# Patient Record
Sex: MALE | Race: WHITE | NOT HISPANIC OR LATINO | Employment: FULL TIME | URBAN - METROPOLITAN AREA
[De-identification: names, ages, dates, MRNs, and addresses within clinical notes are randomized per-mention and may not be internally consistent; named-entity substitution may affect disease eponyms.]

---

## 2017-01-26 ENCOUNTER — HOSPITAL ENCOUNTER (OUTPATIENT)
Dept: URGENT CARE | Facility: CLINIC | Age: 42
Discharge: HOME | End: 2017-01-26
Attending: FAMILY MEDICINE | Admitting: FAMILY MEDICINE

## 2019-01-23 ENCOUNTER — HOSPITAL ENCOUNTER (OUTPATIENT)
Dept: URGENT CARE | Facility: CLINIC | Age: 44
Discharge: HOME | End: 2019-01-23
Attending: FAMILY MEDICINE | Admitting: FAMILY MEDICINE

## 2019-08-24 PROCEDURE — 87186 SC STD MICRODIL/AGAR DIL: CPT | Performed by: FAMILY MEDICINE

## 2019-08-24 PROCEDURE — 87205 SMEAR GRAM STAIN: CPT | Performed by: FAMILY MEDICINE

## 2019-08-24 PROCEDURE — 87070 CULTURE OTHR SPECIMN AEROBIC: CPT | Performed by: FAMILY MEDICINE

## 2019-08-24 PROCEDURE — 87147 CULTURE TYPE IMMUNOLOGIC: CPT | Performed by: FAMILY MEDICINE

## 2020-05-02 ENCOUNTER — HOSPITAL ENCOUNTER (INPATIENT)
Facility: HOSPITAL | Age: 45
LOS: 6 days | Discharge: HOME | End: 2020-05-08
Attending: INTERNAL MEDICINE | Admitting: ORTHOPAEDIC SURGERY

## 2020-05-02 ENCOUNTER — APPOINTMENT (OUTPATIENT)
Dept: GENERAL RADIOLOGY | Facility: HOSPITAL | Age: 45
End: 2020-05-02

## 2020-05-02 DIAGNOSIS — L03.115 CELLULITIS OF RIGHT LOWER EXTREMITY: Primary | ICD-10-CM

## 2020-05-02 DIAGNOSIS — M71.061 ABSCESS OF BURSA, RIGHT KNEE: ICD-10-CM

## 2020-05-02 DIAGNOSIS — M25.561 RIGHT KNEE PAIN, UNSPECIFIED CHRONICITY: ICD-10-CM

## 2020-05-02 DIAGNOSIS — F17.200 TOBACCO DEPENDENCE SYNDROME: Chronic | ICD-10-CM

## 2020-05-02 PROBLEM — J44.1 COPD WITH EXACERBATION (HCC): Status: ACTIVE | Noted: 2019-01-23

## 2020-05-02 PROBLEM — J44.9 COPD (CHRONIC OBSTRUCTIVE PULMONARY DISEASE) (HCC): Chronic | Status: ACTIVE | Noted: 2020-05-02

## 2020-05-02 LAB
ALBUMIN SERPL-MCNC: 3.9 G/DL (ref 3.5–5.2)
ALBUMIN/GLOB SERPL: 1.1 G/DL
ALP SERPL-CCNC: 73 U/L (ref 39–117)
ALT SERPL W P-5'-P-CCNC: 15 U/L (ref 1–41)
ANION GAP SERPL CALCULATED.3IONS-SCNC: 13 MMOL/L (ref 5–15)
APTT PPP: 25.1 SECONDS (ref 24–31)
AST SERPL-CCNC: 15 U/L (ref 1–40)
BASOPHILS # BLD AUTO: 0.1 10*3/MM3 (ref 0–0.2)
BASOPHILS NFR BLD AUTO: 0.5 % (ref 0–1.5)
BILIRUB SERPL-MCNC: 0.3 MG/DL (ref 0.2–1.2)
BUN BLD-MCNC: 10 MG/DL (ref 6–20)
BUN/CREAT SERPL: 9.4 (ref 7–25)
CALCIUM SPEC-SCNC: 9.4 MG/DL (ref 8.6–10.5)
CHLORIDE SERPL-SCNC: 101 MMOL/L (ref 98–107)
CO2 SERPL-SCNC: 23 MMOL/L (ref 22–29)
CREAT BLD-MCNC: 1.06 MG/DL (ref 0.76–1.27)
CRP SERPL-MCNC: 11.88 MG/DL (ref 0–0.5)
D DIMER PPP FEU-MCNC: 0.91 MCGFEU/ML (ref 0.17–0.59)
DEPRECATED RDW RBC AUTO: 42.4 FL (ref 37–54)
EOSINOPHIL # BLD AUTO: 0.1 10*3/MM3 (ref 0–0.4)
EOSINOPHIL NFR BLD AUTO: 0.6 % (ref 0.3–6.2)
ERYTHROCYTE [DISTWIDTH] IN BLOOD BY AUTOMATED COUNT: 13.4 % (ref 12.3–15.4)
ERYTHROCYTE [SEDIMENTATION RATE] IN BLOOD: 75 MM/HR (ref 0–15)
GFR SERPL CREATININE-BSD FRML MDRD: 75 ML/MIN/1.73
GLOBULIN UR ELPH-MCNC: 3.7 GM/DL
GLUCOSE BLD-MCNC: 137 MG/DL (ref 65–99)
HCT VFR BLD AUTO: 42.2 % (ref 37.5–51)
HGB BLD-MCNC: 14.5 G/DL (ref 13–17.7)
INR PPP: 1.08 (ref 0.9–1.1)
LYMPHOCYTES # BLD AUTO: 1.5 10*3/MM3 (ref 0.7–3.1)
LYMPHOCYTES NFR BLD AUTO: 9.1 % (ref 19.6–45.3)
MCH RBC QN AUTO: 31.2 PG (ref 26.6–33)
MCHC RBC AUTO-ENTMCNC: 34.3 G/DL (ref 31.5–35.7)
MCV RBC AUTO: 90.9 FL (ref 79–97)
MONOCYTES # BLD AUTO: 1.3 10*3/MM3 (ref 0.1–0.9)
MONOCYTES NFR BLD AUTO: 8.1 % (ref 5–12)
NEUTROPHILS # BLD AUTO: 13 10*3/MM3 (ref 1.7–7)
NEUTROPHILS NFR BLD AUTO: 81.7 % (ref 42.7–76)
NRBC BLD AUTO-RTO: 0 /100 WBC (ref 0–0.2)
PLATELET # BLD AUTO: 418 10*3/MM3 (ref 140–450)
PMV BLD AUTO: 6.3 FL (ref 6–12)
POTASSIUM BLD-SCNC: 4 MMOL/L (ref 3.5–5.2)
PROT SERPL-MCNC: 7.6 G/DL (ref 6–8.5)
PROTHROMBIN TIME: 11.1 SECONDS (ref 9.6–11.7)
RBC # BLD AUTO: 4.65 10*6/MM3 (ref 4.14–5.8)
SODIUM BLD-SCNC: 137 MMOL/L (ref 136–145)
URATE SERPL-MCNC: 3.3 MG/DL (ref 3.4–7)
WBC NRBC COR # BLD: 15.9 10*3/MM3 (ref 3.4–10.8)

## 2020-05-02 PROCEDURE — 85379 FIBRIN DEGRADATION QUANT: CPT | Performed by: PHYSICIAN ASSISTANT

## 2020-05-02 PROCEDURE — 25010000002 VANCOMYCIN 10 G RECONSTITUTED SOLUTION: Performed by: PHYSICIAN ASSISTANT

## 2020-05-02 PROCEDURE — 85652 RBC SED RATE AUTOMATED: CPT | Performed by: PHYSICIAN ASSISTANT

## 2020-05-02 PROCEDURE — 87205 SMEAR GRAM STAIN: CPT | Performed by: PHYSICIAN ASSISTANT

## 2020-05-02 PROCEDURE — 84550 ASSAY OF BLOOD/URIC ACID: CPT | Performed by: PHYSICIAN ASSISTANT

## 2020-05-02 PROCEDURE — 85730 THROMBOPLASTIN TIME PARTIAL: CPT | Performed by: PHYSICIAN ASSISTANT

## 2020-05-02 PROCEDURE — 99284 EMERGENCY DEPT VISIT MOD MDM: CPT

## 2020-05-02 PROCEDURE — 87040 BLOOD CULTURE FOR BACTERIA: CPT | Performed by: PHYSICIAN ASSISTANT

## 2020-05-02 PROCEDURE — 85025 COMPLETE CBC W/AUTO DIFF WBC: CPT | Performed by: PHYSICIAN ASSISTANT

## 2020-05-02 PROCEDURE — 87186 SC STD MICRODIL/AGAR DIL: CPT | Performed by: PHYSICIAN ASSISTANT

## 2020-05-02 PROCEDURE — G0378 HOSPITAL OBSERVATION PER HR: HCPCS

## 2020-05-02 PROCEDURE — 25010000002 KETOROLAC TROMETHAMINE PER 15 MG: Performed by: PHYSICIAN ASSISTANT

## 2020-05-02 PROCEDURE — 25010000002 MORPHINE PER 10 MG: Performed by: EMERGENCY MEDICINE

## 2020-05-02 PROCEDURE — 99222 1ST HOSP IP/OBS MODERATE 55: CPT | Performed by: PHYSICIAN ASSISTANT

## 2020-05-02 PROCEDURE — 87070 CULTURE OTHR SPECIMN AEROBIC: CPT | Performed by: PHYSICIAN ASSISTANT

## 2020-05-02 PROCEDURE — 86140 C-REACTIVE PROTEIN: CPT | Performed by: PHYSICIAN ASSISTANT

## 2020-05-02 PROCEDURE — 85610 PROTHROMBIN TIME: CPT | Performed by: PHYSICIAN ASSISTANT

## 2020-05-02 PROCEDURE — 80053 COMPREHEN METABOLIC PANEL: CPT | Performed by: PHYSICIAN ASSISTANT

## 2020-05-02 PROCEDURE — 25010000002 ONDANSETRON PER 1 MG: Performed by: EMERGENCY MEDICINE

## 2020-05-02 PROCEDURE — 25010000002 PIPERACILLIN SOD-TAZOBACTAM PER 1 G: Performed by: PHYSICIAN ASSISTANT

## 2020-05-02 PROCEDURE — 87147 CULTURE TYPE IMMUNOLOGIC: CPT | Performed by: PHYSICIAN ASSISTANT

## 2020-05-02 PROCEDURE — 73560 X-RAY EXAM OF KNEE 1 OR 2: CPT

## 2020-05-02 RX ORDER — ONDANSETRON 2 MG/ML
4 INJECTION INTRAMUSCULAR; INTRAVENOUS ONCE
Status: COMPLETED | OUTPATIENT
Start: 2020-05-02 | End: 2020-05-02

## 2020-05-02 RX ORDER — BISACODYL 10 MG
10 SUPPOSITORY, RECTAL RECTAL DAILY PRN
Status: DISCONTINUED | OUTPATIENT
Start: 2020-05-02 | End: 2020-05-06 | Stop reason: HOSPADM

## 2020-05-02 RX ORDER — ONDANSETRON 2 MG/ML
4 INJECTION INTRAMUSCULAR; INTRAVENOUS EVERY 6 HOURS PRN
Status: DISCONTINUED | OUTPATIENT
Start: 2020-05-02 | End: 2020-05-06 | Stop reason: HOSPADM

## 2020-05-02 RX ORDER — POTASSIUM CHLORIDE 20 MEQ/1
40 TABLET, EXTENDED RELEASE ORAL AS NEEDED
Status: DISCONTINUED | OUTPATIENT
Start: 2020-05-02 | End: 2020-05-06 | Stop reason: HOSPADM

## 2020-05-02 RX ORDER — NICOTINE 21 MG/24HR
1 PATCH, TRANSDERMAL 24 HOURS TRANSDERMAL NIGHTLY
Status: DISCONTINUED | OUTPATIENT
Start: 2020-05-02 | End: 2020-05-08 | Stop reason: HOSPADM

## 2020-05-02 RX ORDER — VANCOMYCIN 1.75 GRAM/500 ML IN 0.9 % SODIUM CHLORIDE INTRAVENOUS
20 ONCE
Status: COMPLETED | OUTPATIENT
Start: 2020-05-02 | End: 2020-05-02

## 2020-05-02 RX ORDER — SODIUM CHLORIDE 0.9 % (FLUSH) 0.9 %
10 SYRINGE (ML) INJECTION EVERY 12 HOURS SCHEDULED
Status: DISCONTINUED | OUTPATIENT
Start: 2020-05-02 | End: 2020-05-06 | Stop reason: HOSPADM

## 2020-05-02 RX ORDER — CHOLECALCIFEROL (VITAMIN D3) 125 MCG
5 CAPSULE ORAL NIGHTLY PRN
Status: DISCONTINUED | OUTPATIENT
Start: 2020-05-02 | End: 2020-05-08 | Stop reason: HOSPADM

## 2020-05-02 RX ORDER — MULTIPLE VITAMINS W/ MINERALS TAB 9MG-400MCG
1 TAB ORAL DAILY
COMMUNITY

## 2020-05-02 RX ORDER — ACETAMINOPHEN 325 MG/1
650 TABLET ORAL EVERY 4 HOURS PRN
Status: DISCONTINUED | OUTPATIENT
Start: 2020-05-02 | End: 2020-05-06 | Stop reason: HOSPADM

## 2020-05-02 RX ORDER — NITROGLYCERIN 0.4 MG/1
0.4 TABLET SUBLINGUAL
Status: DISCONTINUED | OUTPATIENT
Start: 2020-05-02 | End: 2020-05-03

## 2020-05-02 RX ORDER — MAGNESIUM SULFATE 1 G/100ML
1 INJECTION INTRAVENOUS AS NEEDED
Status: DISCONTINUED | OUTPATIENT
Start: 2020-05-02 | End: 2020-05-06 | Stop reason: HOSPADM

## 2020-05-02 RX ORDER — ACETAMINOPHEN 650 MG/1
650 SUPPOSITORY RECTAL EVERY 4 HOURS PRN
Status: DISCONTINUED | OUTPATIENT
Start: 2020-05-02 | End: 2020-05-06 | Stop reason: HOSPADM

## 2020-05-02 RX ORDER — SODIUM CHLORIDE 0.9 % (FLUSH) 0.9 %
10 SYRINGE (ML) INJECTION AS NEEDED
Status: DISCONTINUED | OUTPATIENT
Start: 2020-05-02 | End: 2020-05-06 | Stop reason: HOSPADM

## 2020-05-02 RX ORDER — KETOROLAC TROMETHAMINE 30 MG/ML
30 INJECTION, SOLUTION INTRAMUSCULAR; INTRAVENOUS EVERY 6 HOURS PRN
Status: DISCONTINUED | OUTPATIENT
Start: 2020-05-02 | End: 2020-05-06 | Stop reason: HOSPADM

## 2020-05-02 RX ORDER — MAGNESIUM SULFATE HEPTAHYDRATE 40 MG/ML
2 INJECTION, SOLUTION INTRAVENOUS AS NEEDED
Status: DISCONTINUED | OUTPATIENT
Start: 2020-05-02 | End: 2020-05-06 | Stop reason: HOSPADM

## 2020-05-02 RX ORDER — ACETAMINOPHEN 160 MG/5ML
650 SOLUTION ORAL EVERY 4 HOURS PRN
Status: DISCONTINUED | OUTPATIENT
Start: 2020-05-02 | End: 2020-05-06 | Stop reason: HOSPADM

## 2020-05-02 RX ORDER — MORPHINE SULFATE 4 MG/ML
4 INJECTION, SOLUTION INTRAMUSCULAR; INTRAVENOUS ONCE
Status: COMPLETED | OUTPATIENT
Start: 2020-05-02 | End: 2020-05-02

## 2020-05-02 RX ORDER — ALUMINA, MAGNESIA, AND SIMETHICONE 2400; 2400; 240 MG/30ML; MG/30ML; MG/30ML
15 SUSPENSION ORAL EVERY 6 HOURS PRN
Status: DISCONTINUED | OUTPATIENT
Start: 2020-05-02 | End: 2020-05-06 | Stop reason: HOSPADM

## 2020-05-02 RX ORDER — ONDANSETRON 4 MG/1
4 TABLET, FILM COATED ORAL EVERY 6 HOURS PRN
Status: DISCONTINUED | OUTPATIENT
Start: 2020-05-02 | End: 2020-05-06 | Stop reason: HOSPADM

## 2020-05-02 RX ADMIN — PIPERACILLIN AND TAZOBACTAM 3.38 G: 3; .375 INJECTION, POWDER, FOR SOLUTION INTRAVENOUS at 17:32

## 2020-05-02 RX ADMIN — KETOROLAC TROMETHAMINE 30 MG: 30 INJECTION, SOLUTION INTRAMUSCULAR at 21:14

## 2020-05-02 RX ADMIN — Medication 10 ML: at 21:15

## 2020-05-02 RX ADMIN — MORPHINE SULFATE 4 MG: 4 INJECTION INTRAVENOUS at 17:22

## 2020-05-02 RX ADMIN — ONDANSETRON 4 MG: 2 INJECTION INTRAMUSCULAR; INTRAVENOUS at 17:21

## 2020-05-02 RX ADMIN — VANCOMYCIN HYDROCHLORIDE 1750 MG: 10 INJECTION, POWDER, LYOPHILIZED, FOR SOLUTION INTRAVENOUS at 18:03

## 2020-05-02 NOTE — ED PROVIDER NOTES
"Subjective   Patient is a 44-year-old  male, otherwise healthy presents the ER for worsening vehicle with chief complaint of \"right knee pain\".  Patient reports a 9-month history of \"boils that come and go all over my body\".  Patient states that normally these boils appear and resolve on their own. Patient does report previous MRSA history.  Patient states he noticed a boil on anterior aspect of his right knee 2 weeks ago, states that the redness, swelling and pain has progressively gotten worse since then.  Patient rates his pain as moderate to severe.  Patient states that the pain is gotten so bad it hurts to walk or bend his knee due to the swelling.  Patient denies any chest pain, shortness of breath, dumping, nausea vomiting headache or fever or chills.  Patient states that he was seen at [facility] 4 days ago, was given Rocephin shot, anti-inflammatories and discharged with Bactrim.  Patient states he has been taking this as prescribed, was seen at urgent care clinic today for worsening erythema, edema and pain was advised to come to ER for further evaluation.  Patient denies any numbness or tingling in his extremities, patient has no other complaints at this time. Patient is a 1.5 ppd smoker.      History provided by:  Patient and medical records      Review of Systems   Constitutional: Positive for chills. Negative for fever.   HENT: Negative for sore throat and trouble swallowing.    Respiratory: Negative for shortness of breath and wheezing.    Cardiovascular: Negative for chest pain.   Gastrointestinal: Negative for abdominal pain, diarrhea, nausea and vomiting.   Genitourinary: Negative for dysuria.   Musculoskeletal: Positive for arthralgias.        Right knee pain   Skin: Positive for color change. Negative for rash and wound.   Neurological: Negative for dizziness, weakness and headaches.   Psychiatric/Behavioral: Negative for behavioral problems.   All other systems reviewed and " are negative.      No past medical history on file.    No Known Allergies    No past surgical history on file.    Family History   Problem Relation Age of Onset   • Bone cancer Mother    • Diabetes Father    • Stroke Father        Social History     Socioeconomic History   • Marital status: Single     Spouse name: Not on file   • Number of children: Not on file   • Years of education: Not on file   • Highest education level: Not on file   Tobacco Use   • Smoking status: Current Every Day Smoker     Packs/day: 1.00     Years: 20.00     Pack years: 20.00   • Smokeless tobacco: Never Used   Substance and Sexual Activity   • Alcohol use: Yes     Comment: very rare   • Drug use: Yes     Types: Marijuana     Comment: on occassion   • Sexual activity: Defer           Objective   Physical Exam   Constitutional: He is oriented to person, place, and time. He appears well-developed and well-nourished. No distress.   HENT:   Head: Normocephalic and atraumatic.   Eyes: Pupils are equal, round, and reactive to light. EOM are normal.   Cardiovascular: Normal rate, regular rhythm, normal heart sounds and intact distal pulses.   No murmur heard.  Pulmonary/Chest: Effort normal and breath sounds normal. He has no wheezes.   Musculoskeletal: He exhibits edema and tenderness. He exhibits no deformity.   Tenderness palpation anterior and posterior aspect of right knee  DP pulses present 2+ bilaterally  Pain with knee flexion and extension   Neurological: He is alert and oriented to person, place, and time. No sensory deficit.   Skin: Skin is warm. Capillary refill takes less than 2 seconds. No rash noted. He is not diaphoretic. There is erythema.   Significant erythema, warmth surrounding anterior aspect right knee with 1.5 x 1 cm superficial area of skin breakdown with mild drainage  Skin is taut, shiny, erythematous, warm, not fluctuant    Enlarged right inguinal lymph node palpated   Psychiatric: He has a normal mood and affect. His  "behavior is normal.   Nursing note and vitals reviewed.          Procedures           ED Course  ED Course as of May 02 1845   Sat May 02, 2020   1819 Patient reevaluated, reports significant provement in his pain.  Discussed hospital admission as patient has failed outpatient therapy and is currently on vancomycin for antibiotics.  Patient states he does not have insurance.  [facility] was paged and stated that they would see patient in ER    [MM]   1843 Spoke to PATY Song who agreed to accept patient for admission for Dr. Hein    [MM]      ED Course User Index  [MM] Jenna Duvall PA    /71   Pulse 89   Temp 98.5 °F (36.9 °C) (Oral)   Resp 19   Ht 182.9 cm (72\")   Wt 83.8 kg (184 lb 11.9 oz)   SpO2 (!) 78%   BMI 25.06 kg/m²   Labs Reviewed   COMPREHENSIVE METABOLIC PANEL - Abnormal; Notable for the following components:       Result Value    Glucose 137 (*)     All other components within normal limits    Narrative:     GFR Normal >60  Chronic Kidney Disease <60  Kidney Failure <15     URIC ACID - Abnormal; Notable for the following components:    Uric Acid 3.3 (*)     All other components within normal limits   CBC WITH AUTO DIFFERENTIAL - Abnormal; Notable for the following components:    WBC 15.90 (*)     Neutrophil % 81.7 (*)     Lymphocyte % 9.1 (*)     Neutrophils, Absolute 13.00 (*)     Monocytes, Absolute 1.30 (*)     All other components within normal limits   PROTIME-INR - Normal   BLOOD CULTURE   BLOOD CULTURE   WOUND CULTURE   SEDIMENTATION RATE   CBC AND DIFFERENTIAL    Narrative:     The following orders were created for panel order CBC & Differential.  Procedure                               Abnormality         Status                     ---------                               -----------         ------                     CBC Auto Differential[[id number]]        Abnormal            Final result                 Please view results for these tests on the individual orders.     "     Medications   sodium chloride 0.9 % flush 10 mL (has no administration in time range)   Morphine sulfate (PF) injection 4 mg (4 mg Intravenous Given 5/2/20 1722)   ondansetron (ZOFRAN) injection 4 mg (4 mg Intravenous Given 5/2/20 1721)   piperacillin-tazobactam (ZOSYN) IVPB 3.375 g in 100 mL NS (CD) (0 g Intravenous Stopped 5/2/20 1803)   vancomycin IVPB 1750 mg in 0.9% Sodium Chloride (premix) 500 mL (1,750 mg Intravenous New Bag 5/2/20 1803)     No radiology results for the last day                                         MDM  Number of Diagnoses or Management Options  Cellulitis of right lower extremity:   Right knee pain, unspecified chronicity:   Diagnosis management comments: MEDICAL DECISION  Epic Chart Review: Patient was seen at urgent care clinic today prior to ER arrival.  Patient was placed on Bactrim starting 4 days ago.  Comorbidities: Patient denies  Differentials: Cellulitis, abscess, boil, septic arthritis, gout; this list is not all inclusive and does not constitute the entirety of considered causes  Radiology interpretation: Not warranted  Lab interpretation:  Labs viewed by me significant for, blood cultures pending.  Wound culture pending.  CMP glucose 137.  Uric acid low 3.3.  PT/INR 11.1/1.08.  CBC WBC count elevated 15.9.    While in the ED IV was placed and labs were obtained appropriate PPE was worn during exam and throughout all encounters with the patient.  Patient was given 4 mg morphine, 4 mg Zofran.  Blood cultures were obtained.  Patient was started on Vanco and Zosyn antibiotics.  Physical no significant for significantly erythematous, edematous, warm right knee with decreased range of motion.  There is a very small pustular area, however majority of knee is not fluctuant.  There is very high risk for septic arthritis.  Knee joint aspiration was not performed to avoid introducing infection into the knee joint.  There is a small pustular area on anterior right knee, wound culture  was obtained to this is currently pending.  Lab work significant for elevated WBC count 15.9.  Discussed disposition with patient, recommended patient be admitted for IV antibiotics and possible ID and Ortho consult for possible septic arthritis.  Patient was hesitant at first, advised patient that he had failed outpatient treatment and that he needed stronger antibiotics, he eventually agreed to plan for admission.  Consult was placed to [facility] who saw the patient while in the ER for insurance assistance.  Consult placed to hospitalist, spoke with PATY Song who agreed accept patient for Dr. Hein.    Patient made afebrile, nontoxic.  Emergency room.  Patient stable on admission.       Amount and/or Complexity of Data Reviewed  Clinical lab tests: reviewed and ordered    Patient Progress  Patient progress: stable      Final diagnoses:   Cellulitis of right lower extremity   Right knee pain, unspecified chronicity            Jenna Duvall PA  05/02/20 2338

## 2020-05-02 NOTE — H&P
"      [facility] Medicine Services      Patient Name: Acosta Howard  : 1975  MRN: [id number]  Primary Care Physician: Provider, No Known  Date of admission: 2020    Patient Care Team:  Provider, No Known as PCP - General          Subjective   History Present Illness     Chief Complaint:   Chief Complaint   Patient presents with   • Knee Pain       Mr. Howard is a 44 y.o. male presents to [facility] ER on 2020 complaining of right knee pain for the past 1 to 2 weeks.  Pain is sharp in the middle of his knee that is worse with movement.  Patient states he has had trouble walking at all for the past 2 to 3 days.  Patient also states that sometimes pain will shoot up and down his leg.  Patient was seen at [facility] urgent care several days ago and was given Rocephin and Bactrim.  However patient's pain worsened and erythema of the knee worsened as well.  Patient denies numbness, tingling, fever, chills, chest pain, shortness of breath, nausea, vomiting, abdominal pain, diarrhea or constipation.  Patient states that he has had a 9-month history of \"boils that are all over my body\".  Usually these appear and resolve on their own.    In the ED, white blood cell count of 15.9, increased neutrophils.  Elevated sed rate at 75.  Patient otherwise has an unremarkable CBC and CMP and PT/INR.  Wound culture pending, blood cultures x2 pending, patient has history of MRSA infection back on 2019 of left axilla.  Patient is afebrile, pulse in the 80s, on room air oxygen 97% SPO2 and normotensive.    Review of Systems   Constitution: Negative.   HENT: Negative.    Cardiovascular: Negative.    Respiratory: Negative.    Skin: Positive for rash (right knee about 6-7cm across knee).   Musculoskeletal: Positive for joint pain and stiffness.   Gastrointestinal: Negative.    Genitourinary: Negative.    Neurological: Negative.    Psychiatric/Behavioral: Negative.            Personal History     "     Past Medical History:   Past Medical History:   Diagnosis Date   • COPD (chronic obstructive pulmonary disease) (CMS/[facility]) 5/2/2020   • Tobacco dependence syndrome 1/26/2017       Surgical History:    No past surgical history on file.        Family History: family history includes Bone cancer in his mother; Diabetes in his father; Stroke in his father. Otherwise pertinent FHx was reviewed and unremarkable.     Social History:  reports that he has been smoking. He has a 20.00 pack-year smoking history. He has never used smokeless tobacco. He reports that he drinks alcohol. He reports that he has current or past drug history. Drug: Marijuana.      Medications:  Prior to Admission medications    Medication Sig Start Date End Date Taking? Authorizing Provider   sulfamethoxazole-trimethoprim (BACTRIM DS,SEPTRA DS) 800-160 MG per tablet Take 1 tablet by mouth 2 (Two) Times a Day. 4/29/20   Emergency, Nurse Rajani, RN       Allergies:  No Known Allergies    Objective   Objective     Vital Signs  Temp:  [98.5 °F (36.9 °C)-98.8 °F (37.1 °C)] 98.5 °F (36.9 °C)  Heart Rate:  [] 83  Resp:  [19-24] 19  BP: (121-174)/() 121/69  SpO2:  [78 %-100 %] 97 %  on   ;   Device (Oxygen Therapy): room air  Body mass index is 25.06 kg/m².    Physical Exam   Constitutional: He is oriented to person, place, and time. He appears well-developed and well-nourished. No distress.   HENT:   Head: Normocephalic and atraumatic.   Nose: Nose normal.   Mouth/Throat: No oropharyngeal exudate.   Eyes: Pupils are equal, round, and reactive to light. Conjunctivae and EOM are normal.   Neck: Normal range of motion.   Cardiovascular: Normal rate, regular rhythm, normal heart sounds and intact distal pulses.   S1, S2 audible.   Pulmonary/Chest: Effort normal and breath sounds normal. No respiratory distress. He has no wheezes.   On room air.   Abdominal: Soft. Bowel sounds are normal. He exhibits no distension. There is no tenderness.    Musculoskeletal: Normal range of motion. He exhibits no edema, tenderness or deformity.   Neurological: He is alert and oriented to person, place, and time. No cranial nerve deficit.   Skin: Skin is warm. Rash noted. He is not diaphoretic. There is erythema.   Right knee warm to touch and tender. Pain with movement and decreased ROM in right knee when compared to right. 1cm Lymph node appreciated upon palpation in right inguinal area.   Psychiatric: He has a normal mood and affect. His behavior is normal.   Nursing note and vitals reviewed.      Results Review:  I have personally reviewed most recent cardiac tracings, lab results and radiology images and interpretations and agree with findings.    Results from last 7 days   Lab Units 05/02/20  1719   WBC 10*3/mm3 15.90*   HEMOGLOBIN g/dL 14.5   HEMATOCRIT % 42.2   PLATELETS 10*3/mm3 418   INR  1.08     Results from last 7 days   Lab Units 05/02/20  1719   SODIUM mmol/L 137   POTASSIUM mmol/L 4.0   CHLORIDE mmol/L 101   CO2 mmol/L 23.0   BUN mg/dL 10   CREATININE mg/dL 1.06   GLUCOSE mg/dL 137*   CALCIUM mg/dL 9.4   ALT (SGPT) U/L 15   AST (SGOT) U/L 15     Estimated Creatinine Clearance: 103.2 mL/min (by C-G formula based on SCr of 1.06 mg/dL).  Brief Urine Lab Results     None          Microbiology Results (last 10 days)     Procedure Component Value - Date/Time    Wound Culture - Swab, Knee, Right [[id number]] Collected:  05/02/20 1720    Lab Status:  Preliminary result Specimen:  Swab from Knee, Right Updated:  05/02/20 1920     Gram Stain Occasional WBCs seen      No organisms seen          ECG/EMG Results (most recent)     None                    No radiology results for the last 7 days      Estimated Creatinine Clearance: 103.2 mL/min (by C-G formula based on SCr of 1.06 mg/dL).    Assessment/Plan   Assessment/Plan       Active Hospital Problems    Diagnosis  POA   • **Cellulitis of right lower extremity [L03.115]  Yes   • COPD (chronic obstructive pulmonary  disease) (CMS/[facility]) [J44.9]  Yes   • Tobacco dependence syndrome [F17.200]  Yes      Resolved Hospital Problems   No resolved problems to display.       Cellulitis of right lower extremity  -Failed outpatient treatment of Rocephin and Bactrim. Concerned for septic joint  -white blood cell count of 15.9, increased neutrophils.  Elevated sed rate at 75.  -Patient otherwise has an unremarkable CBC and CMP and PT/INR.  -Wound culture pending, blood cultures x2 pending, patient has history of MRSA infection back on 8/24/2019 of left axilla.   -Patient is afebrile, pulse in the 80s, on room air oxygen 97% SPO2 and normotensive.  -xray of right knee ordered  -may require ortho consult   -started on zosyn and vancomycin in ED.  -switch to cefepime and vancomycin per pharmacy  -check CBC and CMP in AM, ESR in am, CRP, PT/INR, PTT, dimer  -wound care ordered, follow cultures  -toradol for pain as needed  -regular diet    COPD  -Not in acute exacerbation  -Not on home medication for this currently    Tobacco abuse  -Encourage cessation  -NicoDerm patch ordered      VTE Prophylaxis - SCDs  Mechanical Order History:      Ordered        Signed and Held  Place Sequential Compression Device  Once         Signed and Held  Maintain Sequential Compression Device  Continuous                 Pharmalogical Order History:     None          CODE STATUS:    Code Status and Medical Interventions:   Ordered at: 05/02/20 1924     Code Status:    CPR     Medical Interventions (Level of Support Prior to Arrest):    Full       This patient has been examined wearing appropriate Personal Protective Equipment. 05/02/20      I discussed the patient's findings and my recommendations with patient.        Electronically signed by PATY Vazquez, 05/02/20, 7:27 PM.  [facility] Hospitalist Team

## 2020-05-03 LAB
ANION GAP SERPL CALCULATED.3IONS-SCNC: 10 MMOL/L (ref 5–15)
BASOPHILS # BLD AUTO: 0.1 10*3/MM3 (ref 0–0.2)
BASOPHILS NFR BLD AUTO: 0.4 % (ref 0–1.5)
BUN BLD-MCNC: 13 MG/DL (ref 6–20)
BUN/CREAT SERPL: 12.6 (ref 7–25)
CALCIUM SPEC-SCNC: 8.8 MG/DL (ref 8.6–10.5)
CHLORIDE SERPL-SCNC: 103 MMOL/L (ref 98–107)
CO2 SERPL-SCNC: 26 MMOL/L (ref 22–29)
CREAT BLD-MCNC: 1.03 MG/DL (ref 0.76–1.27)
DEPRECATED RDW RBC AUTO: 41.6 FL (ref 37–54)
EOSINOPHIL # BLD AUTO: 0.3 10*3/MM3 (ref 0–0.4)
EOSINOPHIL NFR BLD AUTO: 2 % (ref 0.3–6.2)
ERYTHROCYTE [DISTWIDTH] IN BLOOD BY AUTOMATED COUNT: 13.2 % (ref 12.3–15.4)
ERYTHROCYTE [SEDIMENTATION RATE] IN BLOOD: 67 MM/HR (ref 0–15)
GFR SERPL CREATININE-BSD FRML MDRD: 78 ML/MIN/1.73
GLUCOSE BLD-MCNC: 118 MG/DL (ref 65–99)
HCT VFR BLD AUTO: 36.8 % (ref 37.5–51)
HGB BLD-MCNC: 12.7 G/DL (ref 13–17.7)
LYMPHOCYTES # BLD AUTO: 1.8 10*3/MM3 (ref 0.7–3.1)
LYMPHOCYTES NFR BLD AUTO: 12.5 % (ref 19.6–45.3)
MAGNESIUM SERPL-MCNC: 2 MG/DL (ref 1.6–2.6)
MCH RBC QN AUTO: 31.3 PG (ref 26.6–33)
MCHC RBC AUTO-ENTMCNC: 34.7 G/DL (ref 31.5–35.7)
MCV RBC AUTO: 90.4 FL (ref 79–97)
MONOCYTES # BLD AUTO: 1.2 10*3/MM3 (ref 0.1–0.9)
MONOCYTES NFR BLD AUTO: 8.5 % (ref 5–12)
NEUTROPHILS # BLD AUTO: 11 10*3/MM3 (ref 1.7–7)
NEUTROPHILS NFR BLD AUTO: 76.6 % (ref 42.7–76)
NRBC BLD AUTO-RTO: 0 /100 WBC (ref 0–0.2)
PLATELET # BLD AUTO: 396 10*3/MM3 (ref 140–450)
PMV BLD AUTO: 6.5 FL (ref 6–12)
POTASSIUM BLD-SCNC: 4.2 MMOL/L (ref 3.5–5.2)
RBC # BLD AUTO: 4.07 10*6/MM3 (ref 4.14–5.8)
SODIUM BLD-SCNC: 139 MMOL/L (ref 136–145)
VANCOMYCIN PEAK SERPL-MCNC: 13.5 MCG/ML (ref 20–40)
VANCOMYCIN TROUGH SERPL-MCNC: 9.2 MCG/ML (ref 5–20)
WBC NRBC COR # BLD: 14.3 10*3/MM3 (ref 3.4–10.8)

## 2020-05-03 PROCEDURE — G0378 HOSPITAL OBSERVATION PER HR: HCPCS

## 2020-05-03 PROCEDURE — 25010000002 CEFEPIME PER 500 MG: Performed by: INTERNAL MEDICINE

## 2020-05-03 PROCEDURE — 85652 RBC SED RATE AUTOMATED: CPT | Performed by: PHYSICIAN ASSISTANT

## 2020-05-03 PROCEDURE — 80202 ASSAY OF VANCOMYCIN: CPT | Performed by: PHYSICIAN ASSISTANT

## 2020-05-03 PROCEDURE — 80048 BASIC METABOLIC PNL TOTAL CA: CPT | Performed by: PHYSICIAN ASSISTANT

## 2020-05-03 PROCEDURE — 85025 COMPLETE CBC W/AUTO DIFF WBC: CPT | Performed by: PHYSICIAN ASSISTANT

## 2020-05-03 PROCEDURE — 25010000002 KETOROLAC TROMETHAMINE PER 15 MG: Performed by: PHYSICIAN ASSISTANT

## 2020-05-03 PROCEDURE — 25010000002 CEFEPIME PER 500 MG: Performed by: PHYSICIAN ASSISTANT

## 2020-05-03 PROCEDURE — 25010000002 VANCOMYCIN 1 G RECONSTITUTED SOLUTION 1 EACH VIAL: Performed by: PHYSICIAN ASSISTANT

## 2020-05-03 PROCEDURE — 83735 ASSAY OF MAGNESIUM: CPT | Performed by: PHYSICIAN ASSISTANT

## 2020-05-03 PROCEDURE — 99232 SBSQ HOSP IP/OBS MODERATE 35: CPT | Performed by: INTERNAL MEDICINE

## 2020-05-03 RX ADMIN — CEFEPIME HYDROCHLORIDE 2 G: 2 INJECTION, POWDER, FOR SOLUTION INTRAVENOUS at 15:11

## 2020-05-03 RX ADMIN — KETOROLAC TROMETHAMINE 30 MG: 30 INJECTION, SOLUTION INTRAMUSCULAR at 23:47

## 2020-05-03 RX ADMIN — Medication 10 ML: at 08:00

## 2020-05-03 RX ADMIN — ACETAMINOPHEN 650 MG: 325 TABLET, FILM COATED ORAL at 04:48

## 2020-05-03 RX ADMIN — VANCOMYCIN HYDROCHLORIDE 1000 MG: 1 INJECTION, POWDER, LYOPHILIZED, FOR SOLUTION INTRAVENOUS at 11:39

## 2020-05-03 RX ADMIN — VANCOMYCIN HYDROCHLORIDE 1000 MG: 1 INJECTION, POWDER, LYOPHILIZED, FOR SOLUTION INTRAVENOUS at 04:49

## 2020-05-03 RX ADMIN — VANCOMYCIN HYDROCHLORIDE 1000 MG: 1 INJECTION, POWDER, LYOPHILIZED, FOR SOLUTION INTRAVENOUS at 20:04

## 2020-05-03 RX ADMIN — Medication: at 16:12

## 2020-05-03 RX ADMIN — CEFEPIME 2 G: 2 INJECTION, POWDER, FOR SOLUTION INTRAVENOUS at 00:31

## 2020-05-03 RX ADMIN — CEFEPIME 2 G: 2 INJECTION, POWDER, FOR SOLUTION INTRAVENOUS at 07:47

## 2020-05-03 RX ADMIN — KETOROLAC TROMETHAMINE 30 MG: 30 INJECTION, SOLUTION INTRAMUSCULAR at 07:23

## 2020-05-03 RX ADMIN — KETOROLAC TROMETHAMINE 30 MG: 30 INJECTION, SOLUTION INTRAMUSCULAR at 15:26

## 2020-05-03 RX ADMIN — Medication 10 ML: at 20:04

## 2020-05-03 NOTE — PROGRESS NOTES
"Pharmacy Antimicrobial Dosing Service    Subjective:  Acosta Howard is a 44 y.o.male admitted with cellulitis of RLE. Pharmacy has been consulted to dose Vancomycin for possible skin and soft tissue infection.        Assessment/Plan    1. Day #1 Vancomycin: Goal -600 mcg*h/mL. 1750mg x1 in ED.  Followed by 1gm IV q8h.  Peak 5/3 at 1500 and trough 5/3 at 1900.    2. Day #1 Cefepime: 2gm IV q8h for estCrCl > 60 mL/min.    3. Patient did receive 1x dose of zosyn in ED.  Changed to cefepime to decrease risk of ENZO    Will continue to monitor drug levels, renal function, culture and sensitivities, and patient clinical status.       Objective:  Relevant clinical data and objective history reviewed:  182.9 cm (72\")   83.8 kg (184 lb 11.9 oz)   Ideal body weight: 77.6 kg (171 lb 1.2 oz)  Adjusted ideal body weight: 80.1 kg (176 lb 8.7 oz)  Body mass index is 25.06 kg/m².        Results from last 7 days   Lab Units 05/02/20  1719   CREATININE mg/dL 1.06     Estimated Creatinine Clearance: 103.2 mL/min (by C-G formula based on SCr of 1.06 mg/dL).  I/O last 3 completed shifts:  In: 100 [IV Piggyback:100]  Out: -     Results from last 7 days   Lab Units 05/02/20  1719   WBC 10*3/mm3 15.90*     Temperature    05/02/20 1648   Temp: 98.5 °F (36.9 °C)     Baseline culture/source/susceptibility:  Microbiology Results (last 10 days)       Procedure Component Value - Date/Time    Wound Culture - Swab, Knee, Right [[id number]] Collected:  05/02/20 1720    Lab Status:  Preliminary result Specimen:  Swab from Knee, Right Updated:  05/02/20 1920     Gram Stain Occasional WBCs seen      No organisms seen             Anti-Infectives (From admission, onward)      Ordered     Dose/Rate Route Frequency Start Stop    05/02/20 2053  !Vancomycin Level Draw Needed     Ordering Provider:  Duncan Joy PA     Does not apply Once 05/03/20 1900      05/02/20 2053  !Vancomycin Level Draw Needed     Ordering Provider:  Duncan Joy PA     Does not " apply Once 05/03/20 1500      05/02/20 2053  vancomycin (VANCOCIN) 1,000 mg in sodium chloride 0.9 % 250 mL IVPB     Ordering Provider:  Duncan Joy PA    1,000 mg  over 60 Minutes Intravenous Every 8 Hours 05/03/20 0400 05/10/20 0359    05/02/20 2051  cefepime 2 gm IVPB in 100 ml NS (MBP)     Ordering Provider:  Duncan Joy PA    2 g  over 4 Hours Intravenous Every 8 Hours 05/03/20 0000 05/09/20 2359    05/02/20 2041  Pharmacy to dose vancomycin     Ordering Provider:  Duncan Joy PA     Does not apply Continuous PRN 05/02/20 2041 05/09/20 2040 05/02/20 1708  piperacillin-tazobactam (ZOSYN) IVPB 3.375 g in 100 mL NS (CD)     Ordering Provider:  Jenna Duvall PA    3.375 g  over 30 Minutes Intravenous Once 05/02/20 1710 05/02/20 1803    05/02/20 1708  vancomycin IVPB 1750 mg in 0.9% Sodium Chloride (premix) 500 mL     Ordering Provider:  Jenna Duvall PA    20 mg/kg × 83.8 kg Intravenous Once 05/02/20 1710 05/02/20 1803            Jared Terrell PharmD  05/02/20 20:53

## 2020-05-03 NOTE — PLAN OF CARE
Patient did well during the night. Will continue to monitor.   Kami Carbajal)  Pediatrics  [address]  [address]  [place] [address]  Phone: [phone]  Fax: [phone]  Follow Up Time: 1-3 days

## 2020-05-03 NOTE — PLAN OF CARE
Patient has increased pain with ambulation. Wound culture showed MRSA. Receiving IV antibiotics. Will continue to monitor.

## 2020-05-03 NOTE — PROGRESS NOTES
[facility] Medicine Services Daily Progress Note      Hospitalist Team  LOS 0 days      Patient Care Team:  Provider, No Known as PCP - General    Patient Location: 378/1      Subjective   Subjective     Chief Complaint / Subjective  Chief Complaint   Patient presents with   • Knee Pain         Brief Synopsis of Hospital Course/HPI    44-year-old man with history of tobacco dependence.  Presented to the emergency room with complaints of right knee pain that has been going on for the past several days.  Stated initially noticing a boil on the anterior aspect of his right knee then subsequently the knee became swollen, red and painful and this has progressively worsened.  Rates the pain of moderate intensity worsened with movements.  Denies any associated nausea, vomiting or chills nor fever.    Patient stated  initially going to [facility] 4 days ago and was given Rocephin shot, anti-inflammatory agent and discharged on Bactrim.    Symptoms continued and he presented at an urgent care facility on 5/2/2020 and was advised to come to the emergency room for further evaluation.          Date::    5/3  Patient seen and examined  Stated right knee pain somewhat improving  On IV antibiotics        Review of Systems   Constitution: Negative for chills and fever.   HENT: Negative for congestion.    Eyes: Negative for blurred vision.   Cardiovascular: Negative for chest pain.   Respiratory: Negative for cough and shortness of breath.    Endocrine: Negative for cold intolerance and heat intolerance.   Gastrointestinal: Negative for abdominal pain, nausea and vomiting.   Genitourinary: Negative for dysuria.   Neurological: Negative for focal weakness.   Psychiatric/Behavioral: Negative for altered mental status.   All other systems reviewed and are negative.        Objective   Objective      Vital Signs  Temp:  [98.5 °F (36.9 °C)-100.2 °F (37.9 °C)] 100.2 °F (37.9 °C)  Heart Rate:  [] 75  Resp:   "[16-24] 16  BP: (119-174)/() 119/77  Oxygen Therapy  SpO2: 96 %  Pulse Oximetry Type: Intermittent  Device (Oxygen Therapy): room air  Device (Oxygen Therapy): room air  Flowsheet Rows      First Filed Value   Admission Height  182.9 cm (72\") Documented at 05/02/2020 1648   Admission Weight  83.8 kg (184 lb 11.9 oz) Documented at 05/02/2020 1648        Intake & Output (last 3 days)       04/30 0701 - 05/01 0700 05/01 0701 - 05/02 0700 05/02 0701 - 05/03 0700 05/03 0701 - 05/04 0700    IV Piggyback   450     Total Intake(mL/kg)   450 (5.5)     Net   +450                 Lines, Drains & Airways    Active LDAs     Name:   Placement date:   Placement time:   Site:   Days:    Peripheral IV 05/02/20 1721 Left Antecubital   05/02/20    1721    Antecubital   less than 1                  Physical Exam:    Physical Exam   Constitutional: He is oriented to person, place, and time. He appears well-developed. No distress.   HENT:   Head: Normocephalic and atraumatic.   Eyes: Pupils are equal, round, and reactive to light. EOM are normal.   Neck: Neck supple.   Cardiovascular: Normal rate, regular rhythm and normal heart sounds.   Pulmonary/Chest: Effort normal and breath sounds normal. No respiratory distress.   Abdominal: Soft. Bowel sounds are normal. There is no tenderness.   Musculoskeletal: He exhibits no edema.   Neurological: He is alert and oriented to person, place, and time. No cranial nerve deficit.   Skin:   Anterior aspect of the right knee- crusted skin lesion with surrounding erythema, warmth to touch and also tender to touch       Vitals reviewed.           Wounds (last 24 hours)      LDA Wound     Row Name 05/03/20 0723 05/02/20 2057 05/02/20 20:01:20       Wound 05/02/20 2000 Right anterior knee Abcess    Wound - Properties Group Date first assessed: 05/02/20  - Time first assessed: 2000  - Present on Hospital Admission: Y  - Side: Right  - Orientation: anterior  - Location: knee  - Primary " Wound Type: Abcess  -    Dressing Appearance  moist drainage  -TP  moist drainage;open to air  -CP  moist drainage;open to air  -MC    Closure  Open to air  -TP  Open to air  -CP  Open to air  -MC    Base  moist;red;scab  -TP  moist;red;scab;yellow  -CP  moist;red;scab;yellow  -MC    Care, Wound  cleansed with;sterile normal saline  -TP  --  --      User Key  (r) = Recorded By, (t) = Taken By, (c) = Cosigned By    Initials Name Provider Type    Otilia Costello, RN Registered Nurse    Evelin Brown RN Registered Nurse    Janel Man RN Registered Nurse          Procedures:              Results Review:     I reviewed the patient's new clinical results.      Lab Results (last 24 hours)     Procedure Component Value Units Date/Time    Sedimentation Rate [[id number]]  (Abnormal) Collected:  05/03/20 0607    Specimen:  Blood Updated:  05/03/20 0733     Sed Rate 67 mm/hr     Basic Metabolic Panel [[id number]]  (Abnormal) Collected:  05/03/20 0606    Specimen:  Blood Updated:  05/03/20 0702     Glucose 118 mg/dL      BUN 13 mg/dL      Creatinine 1.03 mg/dL      Sodium 139 mmol/L      Potassium 4.2 mmol/L      Chloride 103 mmol/L      CO2 26.0 mmol/L      Calcium 8.8 mg/dL      eGFR Non African Amer 78 mL/min/1.73      BUN/Creatinine Ratio 12.6     Anion Gap 10.0 mmol/L     Narrative:       GFR Normal >60  Chronic Kidney Disease <60  Kidney Failure <15      Magnesium [[id number]]  (Normal) Collected:  05/03/20 0606    Specimen:  Blood Updated:  05/03/20 0702     Magnesium 2.0 mg/dL     CBC Auto Differential [[id number]]  (Abnormal) Collected:  05/03/20 0607    Specimen:  Blood Updated:  05/03/20 0643     WBC 14.30 10*3/mm3      RBC 4.07 10*6/mm3      Hemoglobin 12.7 g/dL      Hematocrit 36.8 %      MCV 90.4 fL      MCH 31.3 pg      MCHC 34.7 g/dL      RDW 13.2 %      RDW-SD 41.6 fl      MPV 6.5 fL      Platelets 396 10*3/mm3      Neutrophil % 76.6 %      Lymphocyte % 12.5 %      Monocyte % 8.5 %       Eosinophil % 2.0 %      Basophil % 0.4 %      Neutrophils, Absolute 11.00 10*3/mm3      Lymphocytes, Absolute 1.80 10*3/mm3      Monocytes, Absolute 1.20 10*3/mm3      Eosinophils, Absolute 0.30 10*3/mm3      Basophils, Absolute 0.10 10*3/mm3      nRBC 0.0 /100 WBC     C-reactive Protein [[id number]]  (Abnormal) Collected:  05/02/20 2215    Specimen:  Blood Updated:  05/02/20 2248     C-Reactive Protein 11.88 mg/dL     aPTT [[id number]]  (Normal) Collected:  05/02/20 2215    Specimen:  Blood Updated:  05/02/20 2240     PTT 25.1 seconds     D-dimer, Quantitative [[id number]]  (Abnormal) Collected:  05/02/20 2215    Specimen:  Blood Updated:  05/02/20 2237     D-Dimer, Quantitative 0.91 MCGFEU/mL     Narrative:       Reference Range  --------------------------------------------------------------------     < 0.50   Negative Predictive Value  0.50-0.59   Indeterminate    >= 0.60   Probable VTE             A very low percentage of patients with DVT may yield D-Dimer results   below the cut-off of 0.50 MCGFEU/mL.  This is known to be more   prevalent in patients with distal DVT.             Results of this test should always be interpreted in conjunction with   the patient's medical history, clinical presentation and other   findings.  Clinical diagnosis should not be based on the result of   IN[place]OV[place] D-Dimer alone.    Wound Culture - Swab, Knee, Right [[id number]] Collected:  05/02/20 1720    Specimen:  Swab from Knee, Right Updated:  05/02/20 1920     Gram Stain Occasional WBCs seen      No organisms seen    Sedimentation Rate [[id number]]  (Abnormal) Collected:  05/02/20 1719    Specimen:  Blood Updated:  05/02/20 1855     Sed Rate 75 mm/hr     Uric Acid [[id number]]  (Abnormal) Collected:  05/02/20 1719    Specimen:  Blood Updated:  05/02/20 1803     Uric Acid 3.3 mg/dL     Comprehensive Metabolic Panel [[id number]]  (Abnormal) Collected:  05/02/20 1719    Specimen:  Blood Updated:  05/02/20 1748     Glucose 137 mg/dL       BUN 10 mg/dL      Creatinine 1.06 mg/dL      Sodium 137 mmol/L      Potassium 4.0 mmol/L      Chloride 101 mmol/L      CO2 23.0 mmol/L      Calcium 9.4 mg/dL      Total Protein 7.6 g/dL      Albumin 3.90 g/dL      ALT (SGPT) 15 U/L      AST (SGOT) 15 U/L      Alkaline Phosphatase 73 U/L      Total Bilirubin 0.3 mg/dL      eGFR Non African Amer 75 mL/min/1.73      Globulin 3.7 gm/dL      A/G Ratio 1.1 g/dL      BUN/Creatinine Ratio 9.4     Anion Gap 13.0 mmol/L     Narrative:       GFR Normal >60  Chronic Kidney Disease <60  Kidney Failure <15      Protime-INR [[id number]]  (Normal) Collected:  05/02/20 1719    Specimen:  Blood Updated:  05/02/20 1734     Protime 11.1 Seconds      INR 1.08    CBC & Differential [[id number]] Collected:  05/02/20 1719    Specimen:  Blood Updated:  05/02/20 1731    Narrative:       The following orders were created for panel order CBC & Differential.  Procedure                               Abnormality         Status                     ---------                               -----------         ------                     CBC Auto Differential[[id number]]        Abnormal            Final result                 Please view results for these tests on the individual orders.    CBC Auto Differential [[id number]]  (Abnormal) Collected:  05/02/20 1719    Specimen:  Blood Updated:  05/02/20 1731     WBC 15.90 10*3/mm3      RBC 4.65 10*6/mm3      Hemoglobin 14.5 g/dL      Hematocrit 42.2 %      MCV 90.9 fL      MCH 31.2 pg      MCHC 34.3 g/dL      RDW 13.4 %      RDW-SD 42.4 fl      MPV 6.3 fL      Platelets 418 10*3/mm3      Neutrophil % 81.7 %      Lymphocyte % 9.1 %      Monocyte % 8.1 %      Eosinophil % 0.6 %      Basophil % 0.5 %      Neutrophils, Absolute 13.00 10*3/mm3      Lymphocytes, Absolute 1.50 10*3/mm3      Monocytes, Absolute 1.30 10*3/mm3      Eosinophils, Absolute 0.10 10*3/mm3      Basophils, Absolute 0.10 10*3/mm3      nRBC 0.0 /100 WBC     Blood Culture - Blood, Arm, Right  [[id number]] Collected:  05/02/20 1724    Specimen:  Blood from Arm, Right Updated:  05/02/20 1727    Blood Culture - Blood, Arm, Left [[id number]] Collected:  05/02/20 1719    Specimen:  Blood from Arm, Left Updated:  05/02/20 1727        No results found for: HGBA1C  Results from last 7 days   Lab Units 05/02/20  1719   INR  1.08           No results found for: LIPASE  No results found for: CHOL, CHLPL, TRIG, HDL, LDL, LDLDIRECT    No results found for: INTRAOP, PREDX, FINALDX, COMDX    Microbiology Results (last 10 days)     Procedure Component Value - Date/Time    Wound Culture - Swab, Knee, Right [[id number]] Collected:  05/02/20 1720    Lab Status:  Preliminary result Specimen:  Swab from Knee, Right Updated:  05/02/20 1920     Gram Stain Occasional WBCs seen      No organisms seen          ECG/EMG Results (most recent)     None                    Xr Knee 1 Or 2 View Right    Result Date: 5/3/2020  Diffuse soft tissue swelling involves the right lower extremity. It is nonspecific. Cellulitis would be in the differential diagnosis.  No subcutaneous emphysema is seen.  No retained radiopaque foreign body is identified.  No acute fracture or acute malalignment.  No definite radiographic evidence for osteomyelitis.  A small right knee joint effusion is suspected.  Electronically Signed By-Dr. Pascual Day MD On:5/3/2020 4:01 AM This report was finalized on [id number] by Dr. Pascual Day MD.          Xrays, labs reviewed personally by physician.    Medication Review:   I have reviewed the patient's current medication list      Scheduled Meds    !Vancomycin Level Draw Needed  Does not apply Once   !Vancomycin Level Draw Needed  Does not apply Once   cefepime 2 g Intravenous Q8H   nicotine 1 patch Transdermal Nightly   sodium chloride 10 mL Intravenous Q12H   vancomycin 1,000 mg Intravenous Q8H       Meds Infusions    Pharmacy to dose vancomycin        Meds PRN  acetaminophen **OR** acetaminophen **OR**  acetaminophen  •  aluminum-magnesium hydroxide-simethicone  •  bisacodyl  •  ketorolac  •  magnesium hydroxide  •  magnesium sulfate **OR** magnesium sulfate in D5W 1g/100mL (PREMIX)  •  melatonin  •  nitroglycerin  •  ondansetron **OR** ondansetron  •  Pharmacy to dose vancomycin  •  potassium chloride  •  [COMPLETED] Insert peripheral IV **AND** sodium chloride  •  sodium chloride    I personally reviewed patient's x-ray films   I personally reviewed patient's EKG strips     Assessment/Plan   Assessment/Plan     Active Hospital Problems    Diagnosis  POA   • **Cellulitis of right lower extremity [L03.115]  Yes   • COPD (chronic obstructive pulmonary disease) (CMS/[facility]) [J44.9]  Yes   • Tobacco dependence syndrome [F17.200]  Yes      Resolved Hospital Problems   No resolved problems to display.       MEDICAL DECISION MAKING COMPLEXITY BY PROBLEM:     Right anterior knee cellulitis  With significant inflammatory markers-elevated C-reactive protein and sed rate  Positive leukocytosis  X-ray right knee-diffuse soft tissue swelling with small right knee joint effusion suspected  Wound culture obtained-pending  Has a history of MRSA  Continue on IV vancomycin while awaiting final wound culture  Discontinue cefepime     Tobacco abuse  Encourage cessation          VTE Prophylaxis - SCDs      Mechanical Order History:      Ordered        05/02/20 2041  Place Sequential Compression Device  Once         05/02/20 2041  Maintain Sequential Compression Device  Continuous                 Pharmalogical Order History:     None            Code Status -   Code Status and Medical Interventions:   Ordered at: 05/02/20 1924     Code Status:    CPR     Medical Interventions (Level of Support Prior to Arrest):    Full           Discharge Planning          Destination      Coordination has not been started for this encounter.      Durable Medical Equipment      Coordination has not been started for this encounter.      Dialysis/Infusion       Coordination has not been started for this encounter.      Home Medical Care      Coordination has not been started for this encounter.      Therapy      Coordination has not been started for this encounter.      Community Resources      Coordination has not been started for this encounter.            Electronically signed by Ellis Hein MD, 05/03/20, 09:38.  [facility] Hospitalist Team

## 2020-05-04 LAB
ANION GAP SERPL CALCULATED.3IONS-SCNC: 11 MMOL/L (ref 5–15)
BACTERIA SPEC AEROBE CULT: ABNORMAL
BASOPHILS # BLD AUTO: 0.1 10*3/MM3 (ref 0–0.2)
BASOPHILS NFR BLD AUTO: 0.4 % (ref 0–1.5)
BUN BLD-MCNC: 14 MG/DL (ref 6–20)
BUN/CREAT SERPL: 12.5 (ref 7–25)
CALCIUM SPEC-SCNC: 9.1 MG/DL (ref 8.6–10.5)
CHLORIDE SERPL-SCNC: 102 MMOL/L (ref 98–107)
CO2 SERPL-SCNC: 26 MMOL/L (ref 22–29)
CREAT BLD-MCNC: 1.12 MG/DL (ref 0.76–1.27)
DEPRECATED RDW RBC AUTO: 42.4 FL (ref 37–54)
EOSINOPHIL # BLD AUTO: 0.3 10*3/MM3 (ref 0–0.4)
EOSINOPHIL NFR BLD AUTO: 2.2 % (ref 0.3–6.2)
ERYTHROCYTE [DISTWIDTH] IN BLOOD BY AUTOMATED COUNT: 13.3 % (ref 12.3–15.4)
GFR SERPL CREATININE-BSD FRML MDRD: 71 ML/MIN/1.73
GLUCOSE BLD-MCNC: 100 MG/DL (ref 65–99)
GRAM STN SPEC: ABNORMAL
GRAM STN SPEC: ABNORMAL
HCT VFR BLD AUTO: 36.1 % (ref 37.5–51)
HGB BLD-MCNC: 12.3 G/DL (ref 13–17.7)
LYMPHOCYTES # BLD AUTO: 1.9 10*3/MM3 (ref 0.7–3.1)
LYMPHOCYTES NFR BLD AUTO: 12.2 % (ref 19.6–45.3)
MAGNESIUM SERPL-MCNC: 2 MG/DL (ref 1.6–2.6)
MCH RBC QN AUTO: 31.1 PG (ref 26.6–33)
MCHC RBC AUTO-ENTMCNC: 34.1 G/DL (ref 31.5–35.7)
MCV RBC AUTO: 91.1 FL (ref 79–97)
MONOCYTES # BLD AUTO: 1.4 10*3/MM3 (ref 0.1–0.9)
MONOCYTES NFR BLD AUTO: 9.2 % (ref 5–12)
NEUTROPHILS # BLD AUTO: 11.6 10*3/MM3 (ref 1.7–7)
NEUTROPHILS NFR BLD AUTO: 76 % (ref 42.7–76)
NRBC BLD AUTO-RTO: 0 /100 WBC (ref 0–0.2)
PLATELET # BLD AUTO: 417 10*3/MM3 (ref 140–450)
PMV BLD AUTO: 6.2 FL (ref 6–12)
POTASSIUM BLD-SCNC: 4.5 MMOL/L (ref 3.5–5.2)
RBC # BLD AUTO: 3.97 10*6/MM3 (ref 4.14–5.8)
SODIUM BLD-SCNC: 139 MMOL/L (ref 136–145)
WBC NRBC COR # BLD: 15.3 10*3/MM3 (ref 3.4–10.8)

## 2020-05-04 PROCEDURE — 25010000002 VANCOMYCIN 10 G RECONSTITUTED SOLUTION: Performed by: PHYSICIAN ASSISTANT

## 2020-05-04 PROCEDURE — 99232 SBSQ HOSP IP/OBS MODERATE 35: CPT | Performed by: HOSPITALIST

## 2020-05-04 PROCEDURE — 83735 ASSAY OF MAGNESIUM: CPT | Performed by: PHYSICIAN ASSISTANT

## 2020-05-04 PROCEDURE — 25010000002 KETOROLAC TROMETHAMINE PER 15 MG: Performed by: PHYSICIAN ASSISTANT

## 2020-05-04 PROCEDURE — 80048 BASIC METABOLIC PNL TOTAL CA: CPT | Performed by: INTERNAL MEDICINE

## 2020-05-04 PROCEDURE — 85025 COMPLETE CBC W/AUTO DIFF WBC: CPT | Performed by: INTERNAL MEDICINE

## 2020-05-04 RX ADMIN — VANCOMYCIN HYDROCHLORIDE 1250 MG: 10 INJECTION, POWDER, LYOPHILIZED, FOR SOLUTION INTRAVENOUS at 12:19

## 2020-05-04 RX ADMIN — VANCOMYCIN HYDROCHLORIDE 1250 MG: 10 INJECTION, POWDER, LYOPHILIZED, FOR SOLUTION INTRAVENOUS at 03:29

## 2020-05-04 RX ADMIN — KETOROLAC TROMETHAMINE 30 MG: 30 INJECTION, SOLUTION INTRAMUSCULAR at 08:22

## 2020-05-04 RX ADMIN — VANCOMYCIN HYDROCHLORIDE 1250 MG: 10 INJECTION, POWDER, LYOPHILIZED, FOR SOLUTION INTRAVENOUS at 19:43

## 2020-05-04 RX ADMIN — Medication 10 ML: at 08:22

## 2020-05-04 RX ADMIN — Medication 10 ML: at 19:44

## 2020-05-04 NOTE — PROGRESS NOTES
"Pharmacy Antimicrobial Dosing Service    Subjective:  Acosta Howard is a 44 y.o.male admitted with cellulitis of RLE. Pharmacy has been consulted to dose Vancomycin for possible skin and soft tissue infection.        Assessment/Plan    1. Day #2 Vancomycin: Goal -600 mcg*h/mL. Current dosingm IV q8h.  Peak 5/3 at 1500 13.5 mcg/mL and trough 9.2 mcg/mL 5/3 at 1900. Calculated   mcg/mL. Will increase dose to 1250 mg (~15 mg/kg ABW) IV q8hr. Predicted AUC ~400 and trough around 11 mcg/mL with new dosing regimen.    Will continue to monitor drug levels, renal function, culture and sensitivities, and patient clinical status.       Objective:  Relevant clinical data and objective history reviewed:  182.9 cm (72\")   83.8 kg (184 lb 11.9 oz)   Ideal body weight: 77.6 kg (171 lb 1.2 oz)  Adjusted ideal body weight: 80.1 kg (176 lb 8.7 oz)  Body mass index is 25.06 kg/m².        Results from last 7 days   Lab Units 20  1719   CREATININE mg/dL 1.06     Estimated Creatinine Clearance: 103.2 mL/min (by C-G formula based on SCr of 1.06 mg/dL).  I/O last 3 completed shifts:  In: 100 [IV Piggyback:100]  Out: -     Results from last 7 days   Lab Units 20  1719   WBC 10*3/mm3 15.90*     Temperature    20 1648   Temp: 98.5 °F (36.9 °C)     Baseline culture/source/susceptibility:  Microbiology Results (last 10 days)       Procedure Component Value - Date/Time    Wound Culture - Swab, Knee, Right [[id number]] Collected:  20 172    Lab Status:  Preliminary result Specimen:  Swab from Knee, Right Updated:  20     Gram Stain Occasional WBCs seen      No organisms seen             Anti-Infectives (From admission, onward)      Ordered     Dose/Rate Route Frequency Start Stop    20  !Vancomycin Level Draw Needed     Ordering Provider:  Duncan Joy PA     Does not apply Once 20 1900      20  !Vancomycin Level Draw Needed     Ordering Provider:  Duncan Joy PA    "  Does not apply Once 05/03/20 1500      05/02/20 2053  vancomycin (VANCOCIN) 1,000 mg in sodium chloride 0.9 % 250 mL IVPB     Ordering Provider:  Duncan Joy PA    1,000 mg  over 60 Minutes Intravenous Every 8 Hours 05/03/20 0400 05/10/20 0359    05/02/20 2051  cefepime 2 gm IVPB in 100 ml NS (MBP)     Ordering Provider:  Duncan Joy PA    2 g  over 4 Hours Intravenous Every 8 Hours 05/03/20 0000 05/09/20 2359    05/02/20 2041  Pharmacy to dose vancomycin     Ordering Provider:  Duncan Joy PA     Does not apply Continuous PRN 05/02/20 2041 05/09/20 2040 05/02/20 1708  piperacillin-tazobactam (ZOSYN) IVPB 3.375 g in 100 mL NS (CD)     Ordering Provider:  Jenna Duvall PA    3.375 g  over 30 Minutes Intravenous Once 05/02/20 1710 05/02/20 1803    05/02/20 1708  vancomycin IVPB 1750 mg in 0.9% Sodium Chloride (premix) 500 mL     Ordering Provider:  Jenna Duvall PA    20 mg/kg × 83.8 kg Intravenous Once 05/02/20 1710 05/02/20 1803            Rosa France PharmD  5/3/20 2015

## 2020-05-04 NOTE — PROGRESS NOTES
"Pharmacy Antimicrobial Dosing Service    Subjective:  Acosta Howard is a 44 y.o.male admitted with cellulitis of RLE. Pharmacy has been consulted to dose Vancomycin for possible skin and soft tissue infection.        Assessment/Plan    1. Day #3 Vancomycin: Goal -600 mcg*h/mL. Current dosin.25gm IV q8h.  Levels prior to 5th new dose as follows: Peak 5/5 08:00 and Trough 5/5 11:00.  Will continue to follow and adjust dose for goal -600.    Will continue to monitor drug levels, renal function, culture and sensitivities, and patient clinical status.       Objective:  Relevant clinical data and objective history reviewed:  182.9 cm (72\")   82.5 kg (181 lb 14.1 oz)   Ideal body weight: 77.6 kg (171 lb 1.2 oz)  Adjusted ideal body weight: 79.6 kg (175 lb 6.4 oz)  Body mass index is 24.67 kg/m².    Results from last 7 days   Lab Units 20  1851 20  1540   VANCOMYCIN PK mcg/mL  --  13.50*   VANCOMYCIN TR mcg/mL 9.20  --      Results from last 7 days   Lab Units 20  0451 20  0606 20  1719   CREATININE mg/dL 1.12 1.03 1.06     Estimated Creatinine Clearance: 96.2 mL/min (by C-G formula based on SCr of 1.12 mg/dL).  I/O last 3 completed shifts:  In: 830 [P.O.:480; IV Piggyback:350]  Out: -     Results from last 7 days   Lab Units 20  0451 20  0607 20  1719   WBC 10*3/mm3 15.30* 14.30* 15.90*     Temperature    20 1232 20 1917 20 0402   Temp: 98.6 °F (37 °C) 98.1 °F (36.7 °C) 99.5 °F (37.5 °C)     Baseline culture/source/susceptibility:  Microbiology Results (last 10 days)       Procedure Component Value - Date/Time    Blood Culture - Blood, Arm, Right [[id number]] Collected:  20    Lab Status:  Preliminary result Specimen:  Blood from Arm, Right Updated:  20     Blood Culture No growth at 24 hours    Wound Culture - Swab, Knee, Right [[id number]]  (Abnormal) Collected:  20 172    Lab Status:  Preliminary result " Specimen:  Swab from Knee, Right Updated:  05/03/20 1605     Wound Culture Light growth (2+) Staphylococcus aureus, MRSA     Comment:   Methicillin resistant Staphylococcus aureus, Patient may be an isolation risk.        Gram Stain Occasional WBCs seen      No organisms seen    Blood Culture - Blood, Arm, Left [[id number]] Collected:  05/02/20 1719    Lab Status:  Preliminary result Specimen:  Blood from Arm, Left Updated:  05/03/20 1730     Blood Culture No growth at 24 hours             Anti-Infectives (From admission, onward)      Ordered     Dose/Rate Route Frequency Start Stop    05/04/20 0911  !Vancomycin Level Draw Needed     Ordering Provider:  Liat Staples MD     Does not apply Once 05/05/20 1100      05/04/20 0911  !Vancomycin Level Draw Needed     Ordering Provider:  Liat Staples MD     Does not apply Once 05/05/20 0800      05/03/20 2007  vancomycin 1250 mg/250 mL 0.9% NS IVPB ([facility])  Review   Ordering Provider:  Duncan Joy PA    1,250 mg  over 90 Minutes Intravenous Every 8 Hours 05/04/20 0400 05/10/20 0359    05/02/20 2053  !Vancomycin Level Draw Needed     Ordering Provider:  Duncan Joy PA     Does not apply Once 05/03/20 1900      05/02/20 2053  !Vancomycin Level Draw Needed     Ordering Provider:  Duncan Joy PA     Does not apply Once 05/03/20 1500 05/03/20 1612    05/02/20 2041  Pharmacy to dose vancomycin  Review   Ordering Provider:  Duncan Joy PA     Does not apply Continuous PRN 05/02/20 2041 05/09/20 2040    05/02/20 1708  piperacillin-tazobactam (ZOSYN) IVPB 3.375 g in 100 mL NS (CD)     Ordering Provider:  Jenna Duvall PA    3.375 g  over 30 Minutes Intravenous Once 05/02/20 1710 05/02/20 1803    05/02/20 1708  vancomycin IVPB 1750 mg in 0.9% Sodium Chloride (premix) 500 mL     Ordering Provider:  Jenna Duvall PA    20 mg/kg × 83.8 kg Intravenous Once 05/02/20 1710 05/02/20 1803            Remy Kahn, PharmD  5/4/20 2015

## 2020-05-04 NOTE — PROGRESS NOTES
[facility] Medicine Services Daily Progress Note      Hospitalist Team  LOS 0 days      Patient Care Team:  Provider, No Known as PCP - General    Patient Location: 378/1      Subjective   Subjective   Still noted to have very significant redness and swelling involving the right knee area but improving, denies for any other active complaint.    Chief Complaint / Subjective  Chief Complaint   Patient presents with   • Knee Pain         Brief Synopsis of Hospital Course/HPI    44-year-old man with history of tobacco dependence.  Presented to the emergency room with complaints of right knee pain that has been going on for the past several days.  Stated initially noticing a boil on the anterior aspect of his right knee then subsequently the knee became swollen, red and painful and this has progressively worsened.  Rates the pain of moderate intensity worsened with movements.  Denies any associated nausea, vomiting or chills nor fever.    Patient stated  initially going to [facility] 4 days ago and was given Rocephin shot, anti-inflammatory agent and discharged on Bactrim.    Symptoms continued and he presented at an urgent care facility on 5/2/2020 and was advised to come to the emergency room for further evaluation.          Date::    5/3  Patient seen and examined  Stated right knee pain somewhat improving  On IV antibiotics        Review of Systems   Constitution: Negative for chills and fever.   HENT: Negative for congestion.    Eyes: Negative for blurred vision.   Cardiovascular: Negative for chest pain.   Respiratory: Negative for cough and shortness of breath.    Endocrine: Negative for cold intolerance and heat intolerance.   Gastrointestinal: Negative for abdominal pain, nausea and vomiting.   Genitourinary: Negative for dysuria.   Neurological: Negative for focal weakness.   Psychiatric/Behavioral: Negative for altered mental status.   All other systems reviewed and are  "negative.        Objective   Objective      Vital Signs  Temp:  [98.1 °F (36.7 °C)-99.5 °F (37.5 °C)] 98.5 °F (36.9 °C)  Heart Rate:  [79-94] 85  Resp:  [15-17] 15  BP: (138-164)/(83-97) 153/97  Oxygen Therapy  SpO2: 96 %  Pulse Oximetry Type: Intermittent  Device (Oxygen Therapy): room air  Device (Oxygen Therapy): room air  Flowsheet Rows      First Filed Value   Admission Height  182.9 cm (72\") Documented at 05/02/2020 1648   Admission Weight  83.8 kg (184 lb 11.9 oz) Documented at 05/02/2020 1648        Intake & Output (last 3 days)       05/01 0701 - 05/02 0700 05/02 0701 - 05/03 0700 05/03 0701 - 05/04 0700 05/04 0701 - 05/05 0700    P.O.   480 740    IV Piggyback  450  250    Total Intake(mL/kg)  450 (5.5) 480 (5.8) 990 (12)    Net  +450 +480 +990            Urine Unmeasured Occurrence   1 x         Lines, Drains & Airways    Active LDAs     Name:   Placement date:   Placement time:   Site:   Days:    Peripheral IV 05/02/20 1721 Left Antecubital   05/02/20    1721    Antecubital   less than 1                  Physical Exam:    Physical Exam   Constitutional: He is oriented to person, place, and time. He appears well-developed. No distress.   HENT:   Head: Normocephalic and atraumatic.   Eyes: Pupils are equal, round, and reactive to light. EOM are normal.   Neck: Neck supple.   Cardiovascular: Normal rate, regular rhythm and normal heart sounds.   Pulmonary/Chest: Effort normal and breath sounds normal. No respiratory distress.   Abdominal: Soft. Bowel sounds are normal. There is no tenderness.   Musculoskeletal: He exhibits no edema.   Neurological: He is alert and oriented to person, place, and time. No cranial nerve deficit.   Skin:   Anterior aspect of the right knee- crusted skin lesion with surrounding erythema, warmth to touch and also tender to touch       Vitals reviewed.           Wounds (last 24 hours)      LDA Wound     Row Name 05/04/20 0822 05/03/20 1901          Wound 05/02/20 2000 Right " anterior knee Abcess    Wound - Properties Group Date first assessed: 05/02/20  - Time first assessed: 2000  - Present on Hospital Admission: Y  - Side: Right  - Orientation: anterior  - Location: knee  - Primary Wound Type: Abcess  -MC    Dressing Appearance  moist drainage  -AF  moist drainage  -KT     Closure  Open to air  -AF  Open to air  -KT     Base  moist;red;scab  -AF  moist;red;scab  -KT     Care, Wound  cleansed with;sterile normal saline  -AF  --       User Key  (r) = Recorded By, (t) = Taken By, (c) = Cosigned By    Initials Name Provider Type    Kristin Allan, RN Registered Nurse    Otilia Costello RN Registered Nurse    [place] LPN Licensed Nurse          Procedures:              Results Review:     I reviewed the patient's new clinical results.      Lab Results (last 24 hours)     Procedure Component Value Units Date/Time    Wound Culture - Swab, Knee, Right [[id number]]  (Abnormal)  (Susceptibility) Collected:  05/02/20 1720    Specimen:  Swab from Knee, Right Updated:  05/04/20 1242     Wound Culture Light growth (2+) Staphylococcus aureus, MRSA     Comment:   Methicillin resistant Staphylococcus aureus, Patient may be an isolation risk.        Gram Stain Occasional WBCs seen      No organisms seen    Susceptibility      Staphylococcus aureus, MRSA     ABEL     Clindamycin Susceptible     Erythromycin Resistant     Inducible Clindamycin Resistance Negative     Oxacillin Resistant     Penicillin G Resistant     Rifampin Susceptible     Tetracycline Susceptible     Trimethoprim + Sulfamethoxazole Susceptible     Vancomycin Susceptible                Susceptibility Comments     Staphylococcus aureus, MRSA    This isolate does not demonstrate inducible clindamycin resistance in vitro.               Magnesium [[id number]]  (Normal) Collected:  05/04/20 0451    Specimen:  Blood Updated:  05/04/20 0544     Magnesium 2.0 mg/dL     Basic Metabolic Panel [[id number]]   (Abnormal) Collected:  05/04/20 0451    Specimen:  Blood Updated:  05/04/20 0544     Glucose 100 mg/dL      BUN 14 mg/dL      Creatinine 1.12 mg/dL      Sodium 139 mmol/L      Potassium 4.5 mmol/L      Chloride 102 mmol/L      CO2 26.0 mmol/L      Calcium 9.1 mg/dL      eGFR Non African Amer 71 mL/min/1.73      BUN/Creatinine Ratio 12.5     Anion Gap 11.0 mmol/L     Narrative:       GFR Normal >60  Chronic Kidney Disease <60  Kidney Failure <15      CBC & Differential [[id number]] Collected:  05/04/20 0451    Specimen:  Blood Updated:  05/04/20 0521    Narrative:       The following orders were created for panel order CBC & Differential.  Procedure                               Abnormality         Status                     ---------                               -----------         ------                     CBC Auto Differential[[id number]]        Abnormal            Final result                 Please view results for these tests on the individual orders.    CBC Auto Differential [[id number]]  (Abnormal) Collected:  05/04/20 0451    Specimen:  Blood Updated:  05/04/20 0521     WBC 15.30 10*3/mm3      RBC 3.97 10*6/mm3      Hemoglobin 12.3 g/dL      Hematocrit 36.1 %      MCV 91.1 fL      MCH 31.1 pg      MCHC 34.1 g/dL      RDW 13.3 %      RDW-SD 42.4 fl      MPV 6.2 fL      Platelets 417 10*3/mm3      Neutrophil % 76.0 %      Lymphocyte % 12.2 %      Monocyte % 9.2 %      Eosinophil % 2.2 %      Basophil % 0.4 %      Neutrophils, Absolute 11.60 10*3/mm3      Lymphocytes, Absolute 1.90 10*3/mm3      Monocytes, Absolute 1.40 10*3/mm3      Eosinophils, Absolute 0.30 10*3/mm3      Basophils, Absolute 0.10 10*3/mm3      nRBC 0.0 /100 WBC     Vancomycin, Trough [[id number]]  (Normal) Collected:  05/03/20 1851    Specimen:  Blood Updated:  05/03/20 1934     Vancomycin Trough 9.20 mcg/mL     Blood Culture - Blood, Arm, Right [[id number]] Collected:  05/02/20 1724    Specimen:  Blood from Arm, Right Updated:  05/03/20 5234      Blood Culture No growth at 24 hours    Blood Culture - Blood, Arm, Left [[id number]] Collected:  05/02/20 1719    Specimen:  Blood from Arm, Left Updated:  05/03/20 1730     Blood Culture No growth at 24 hours    Vancomycin, Peak [[id number]]  (Abnormal) Collected:  05/03/20 1540    Specimen:  Blood Updated:  05/03/20 1646     Vancomycin Peak 13.50 mcg/mL         No results found for: HGBA1C  Results from last 7 days   Lab Units 05/02/20  1719   INR  1.08           No results found for: LIPASE  No results found for: CHOL, CHLPL, TRIG, HDL, LDL, LDLDIRECT    No results found for: INTRAOP, PREDX, FINALDX, COMDX    Microbiology Results (last 10 days)     Procedure Component Value - Date/Time    Blood Culture - Blood, Arm, Right [[id number]] Collected:  05/02/20 1724    Lab Status:  Preliminary result Specimen:  Blood from Arm, Right Updated:  05/03/20 1730     Blood Culture No growth at 24 hours    Wound Culture - Swab, Knee, Right [[id number]]  (Abnormal)  (Susceptibility) Collected:  05/02/20 1720    Lab Status:  Final result Specimen:  Swab from Knee, Right Updated:  05/04/20 1242     Wound Culture Light growth (2+) Staphylococcus aureus, MRSA     Comment:   Methicillin resistant Staphylococcus aureus, Patient may be an isolation risk.        Gram Stain Occasional WBCs seen      No organisms seen    Susceptibility      Staphylococcus aureus, MRSA     ABEL     Clindamycin Susceptible     Erythromycin Resistant     Inducible Clindamycin Resistance Negative     Oxacillin Resistant     Penicillin G Resistant     Rifampin Susceptible     Tetracycline Susceptible     Trimethoprim + Sulfamethoxazole Susceptible     Vancomycin Susceptible                Susceptibility Comments     Staphylococcus aureus, MRSA    This isolate does not demonstrate inducible clindamycin resistance in vitro.               Blood Culture - Blood, Arm, Left [[id number]] Collected:  05/02/20 1719    Lab Status:  Preliminary result Specimen:  Blood from  Arm, Left Updated:  05/03/20 5583     Blood Culture No growth at 24 hours          ECG/EMG Results (most recent)     None                    Xr Knee 1 Or 2 View Right    Result Date: 5/3/2020  Diffuse soft tissue swelling involves the right lower extremity. It is nonspecific. Cellulitis would be in the differential diagnosis.  No subcutaneous emphysema is seen.  No retained radiopaque foreign body is identified.  No acute fracture or acute malalignment.  No definite radiographic evidence for osteomyelitis.  A small right knee joint effusion is suspected.  Electronically Signed By-Dr. Pascual Day MD On:5/3/2020 4:01 AM This report was finalized on [id number] by Dr. Pascual Day MD.          Xrays, labs reviewed personally by physician.    Medication Review:   I have reviewed the patient's current medication list      Scheduled Meds    !Vancomycin Level Draw Needed  Does not apply Once   [START ON 5/5/2020] !Vancomycin Level Draw Needed  Does not apply Once   [START ON 5/5/2020] !Vancomycin Level Draw Needed  Does not apply Once   ampicillin-sulbactam 2 g Intravenous Q8H   nicotine 1 patch Transdermal Nightly   sodium chloride 10 mL Intravenous Q12H   vancomycin 1,250 mg Intravenous Q8H       Meds Infusions    Pharmacy to dose vancomycin        Meds PRN  acetaminophen **OR** acetaminophen **OR** acetaminophen  •  aluminum-magnesium hydroxide-simethicone  •  bisacodyl  •  ketorolac  •  magnesium hydroxide  •  magnesium sulfate **OR** magnesium sulfate in D5W 1g/100mL (PREMIX)  •  melatonin  •  ondansetron **OR** ondansetron  •  Pharmacy to dose vancomycin  •  potassium chloride  •  [COMPLETED] Insert peripheral IV **AND** sodium chloride  •  sodium chloride    I personally reviewed patient's x-ray films   I personally reviewed patient's EKG strips     Assessment/Plan   Assessment/Plan     Active Hospital Problems    Diagnosis  POA   • **Cellulitis of right lower extremity [L03.115]  Yes   • COPD (chronic  obstructive pulmonary disease) (CMS/[facility]) [J44.9]  Yes   • Tobacco dependence syndrome [F17.200]  Yes      Resolved Hospital Problems   No resolved problems to display.       MEDICAL DECISION MAKING COMPLEXITY BY PROBLEM:     Right anterior knee cellulitis improving.  With significant inflammatory markers-elevated C-reactive protein and sed rate  Positive leukocytosis  X-ray right knee-diffuse soft tissue swelling with small right knee joint effusion suspected  Wound culture obtained-pending  Has a history of MRSA  Continue on IV vancomycin pharmacy to dose ... Wound culture positive for MRSA   Monitor redness and swelling involving the right knee area.       Tobacco abuse  Encourage cessation          VTE Prophylaxis - SCDs      Mechanical Order History:      Ordered        05/02/20 2041  Place Sequential Compression Device  Once         05/02/20 2041  Maintain Sequential Compression Device  Continuous                 Pharmalogical Order History:     None            Code Status -   Code Status and Medical Interventions:   Ordered at: 05/02/20 1924     Code Status:    CPR     Medical Interventions (Level of Support Prior to Arrest):    Full           Discharge Planning          Destination      Coordination has not been started for this encounter.      Durable Medical Equipment      Coordination has not been started for this encounter.      Dialysis/Infusion      Coordination has not been started for this encounter.      Home Medical Care      Coordination has not been started for this encounter.      Therapy      Coordination has not been started for this encounter.      Community Resources      Coordination has not been started for this encounter.            Electronically signed by Liat Staples MD, 05/04/20, 14:53.  [facility] Hospitalist Team

## 2020-05-04 NOTE — PROGRESS NOTES
Discharge Planning Assessment   Jan     Patient Name: Acosta Howard  MRN: [id number]  Today's Date: 5/4/2020    Admit Date: 5/2/2020          Plan    Plan  needs case managed; will attempt again on 05/05    Plan Comments  -- attempted to call patient in room with no response; left message with chitra [phone] for return call       Carol naegele rn  Case management  Office number [phone]  Cell phone [phone]

## 2020-05-04 NOTE — PLAN OF CARE
Problem: Patient Care Overview  Goal: Plan of Care Review  Outcome: Ongoing (interventions implemented as appropriate)  Flowsheets (Taken 5/4/2020 0311)  Progress: no change  Plan of Care Reviewed With: patient  Outcome Summary: Patient resting in bed with complaints of pain in his right knee, he recieved pain meds per MAR order. He gets up ad madiha and is getting IV abx. Will continue to monitor patient

## 2020-05-04 NOTE — PROGRESS NOTES
Discharge Planning Assessment  [facility]     Patient Name: Acosta Howard  MRN: [id number]  Today's Date: 5/4/2020    Admit Date: 5/2/2020    Discharge Needs Assessment     Row Name 05/04/20 1628       Living Environment    Lives With  parent(s) lives with dad    Current Living Arrangements  home/apartment/condo    Potentially Unsafe Housing Conditions  unable to assess    Primary Care Provided by  self    Provides Primary Care For  no one    Family Caregiver if Needed  parent(s) father    Quality of Family Relationships  unable to assess       Resource/Environmental Concerns    Resource/Environmental Concerns  none    Transportation Concerns  car, none       Transition Planning    Patient/Family Anticipates Transition to  home with family    Patient/Family Anticipated Services at Transition      Transportation Anticipated  car, drives self       Discharge Needs Assessment    Readmission Within the Last 30 Days  no previous admission in last 30 days    Concerns to be Addressed  discharge planning    Equipment Currently Used at Home  none    Anticipated Changes Related to Illness  inability to care for self    Provided Post Acute Provider List?  N/A    N/A Provider List Comment  patient has no insurance or primary md        Discharge Plan     Row Name 05/04/20 1633       Plan    Plan  from home with father; if needs iv antibiotics will need to come thru ambulatory services    Patient/Family in Agreement with Plan  yes spoke to father on phone and he states he lives with him; patient was working but was laid off; he does not  have insurance       Carol naegele rn  Case management  Office number [phone]  Cell phone [phone]

## 2020-05-05 ENCOUNTER — APPOINTMENT (OUTPATIENT)
Dept: MRI IMAGING | Facility: HOSPITAL | Age: 45
End: 2020-05-05

## 2020-05-05 PROBLEM — M71.061 ABSCESS OF BURSA, RIGHT KNEE: Status: ACTIVE | Noted: 2020-05-02

## 2020-05-05 LAB
ANION GAP SERPL CALCULATED.3IONS-SCNC: 12 MMOL/L (ref 5–15)
BASOPHILS # BLD AUTO: 0.1 10*3/MM3 (ref 0–0.2)
BASOPHILS NFR BLD AUTO: 0.9 % (ref 0–1.5)
BUN BLD-MCNC: 12 MG/DL (ref 6–20)
BUN/CREAT SERPL: 13.8 (ref 7–25)
CALCIUM SPEC-SCNC: 9.3 MG/DL (ref 8.6–10.5)
CHLORIDE SERPL-SCNC: 102 MMOL/L (ref 98–107)
CO2 SERPL-SCNC: 23 MMOL/L (ref 22–29)
CREAT BLD-MCNC: 0.87 MG/DL (ref 0.76–1.27)
DEPRECATED RDW RBC AUTO: 42 FL (ref 37–54)
EOSINOPHIL # BLD AUTO: 0.3 10*3/MM3 (ref 0–0.4)
EOSINOPHIL NFR BLD AUTO: 2.1 % (ref 0.3–6.2)
ERYTHROCYTE [DISTWIDTH] IN BLOOD BY AUTOMATED COUNT: 13.1 % (ref 12.3–15.4)
GFR SERPL CREATININE-BSD FRML MDRD: 94 ML/MIN/1.73
GLUCOSE BLD-MCNC: 136 MG/DL (ref 65–99)
HCT VFR BLD AUTO: 37.9 % (ref 37.5–51)
HGB BLD-MCNC: 12.9 G/DL (ref 13–17.7)
LYMPHOCYTES # BLD AUTO: 2.1 10*3/MM3 (ref 0.7–3.1)
LYMPHOCYTES NFR BLD AUTO: 14.3 % (ref 19.6–45.3)
MAGNESIUM SERPL-MCNC: 2.3 MG/DL (ref 1.6–2.6)
MCH RBC QN AUTO: 30.9 PG (ref 26.6–33)
MCHC RBC AUTO-ENTMCNC: 34.1 G/DL (ref 31.5–35.7)
MCV RBC AUTO: 90.7 FL (ref 79–97)
MONOCYTES # BLD AUTO: 1.3 10*3/MM3 (ref 0.1–0.9)
MONOCYTES NFR BLD AUTO: 9.2 % (ref 5–12)
NEUTROPHILS # BLD AUTO: 10.6 10*3/MM3 (ref 1.7–7)
NEUTROPHILS NFR BLD AUTO: 73.5 % (ref 42.7–76)
NRBC BLD AUTO-RTO: 0.1 /100 WBC (ref 0–0.2)
PLATELET # BLD AUTO: 439 10*3/MM3 (ref 140–450)
PMV BLD AUTO: 6.1 FL (ref 6–12)
POTASSIUM BLD-SCNC: 4.5 MMOL/L (ref 3.5–5.2)
RBC # BLD AUTO: 4.17 10*6/MM3 (ref 4.14–5.8)
SODIUM BLD-SCNC: 137 MMOL/L (ref 136–145)
VANCOMYCIN PEAK SERPL-MCNC: 16.6 MCG/ML (ref 20–40)
VANCOMYCIN TROUGH SERPL-MCNC: 12 MCG/ML (ref 5–20)
VANCOMYCIN TROUGH SERPL-MCNC: 16.3 MCG/ML (ref 5–20)
WBC NRBC COR # BLD: 14.4 10*3/MM3 (ref 3.4–10.8)

## 2020-05-05 PROCEDURE — 25010000002 VANCOMYCIN 10 G RECONSTITUTED SOLUTION: Performed by: PHYSICIAN ASSISTANT

## 2020-05-05 PROCEDURE — 73721 MRI JNT OF LWR EXTRE W/O DYE: CPT

## 2020-05-05 PROCEDURE — 83735 ASSAY OF MAGNESIUM: CPT | Performed by: PHYSICIAN ASSISTANT

## 2020-05-05 PROCEDURE — 99232 SBSQ HOSP IP/OBS MODERATE 35: CPT | Performed by: HOSPITALIST

## 2020-05-05 PROCEDURE — 80048 BASIC METABOLIC PNL TOTAL CA: CPT | Performed by: HOSPITALIST

## 2020-05-05 PROCEDURE — 80202 ASSAY OF VANCOMYCIN: CPT | Performed by: HOSPITALIST

## 2020-05-05 PROCEDURE — 85025 COMPLETE CBC W/AUTO DIFF WBC: CPT | Performed by: HOSPITALIST

## 2020-05-05 RX ORDER — SODIUM CHLORIDE 9 MG/ML
75 INJECTION, SOLUTION INTRAVENOUS CONTINUOUS
Status: CANCELLED | OUTPATIENT
Start: 2020-05-05

## 2020-05-05 RX ADMIN — VANCOMYCIN HYDROCHLORIDE 1250 MG: 10 INJECTION, POWDER, LYOPHILIZED, FOR SOLUTION INTRAVENOUS at 14:14

## 2020-05-05 RX ADMIN — VANCOMYCIN HYDROCHLORIDE 1250 MG: 10 INJECTION, POWDER, LYOPHILIZED, FOR SOLUTION INTRAVENOUS at 23:31

## 2020-05-05 RX ADMIN — VANCOMYCIN HYDROCHLORIDE 1250 MG: 10 INJECTION, POWDER, LYOPHILIZED, FOR SOLUTION INTRAVENOUS at 03:39

## 2020-05-05 RX ADMIN — Medication 10 ML: at 23:31

## 2020-05-05 NOTE — PROGRESS NOTES
"Pharmacy Antimicrobial Dosing Service    Subjective:  Acosta Howard is a 44 y.o.male admitted with cellulitis of RLE. Pharmacy has been consulted to dose Vancomycin for possible skin and soft tissue infection.        Assessment/Plan    1. Day #5 Vancomycin: Goal -600 mcg*h/mL. Current dosin.25gm IV q8h.  Levels drawn  prior to 5th new dose as follows: Peak 5/5 =16.6 at 09:32 and   Trough 5/5=12 at 12:57 (~1h late).  Calculated AUC~454 (therapeutic).  Will continue current dose.  Follow up trough level 5/8 at 11:00 (sooner if clinically indicated).    Will continue to monitor drug levels, renal function, culture and sensitivities, and patient clinical status.       Objective:  Relevant clinical data and objective history reviewed:  182.9 cm (72\")   82.5 kg (181 lb 14.1 oz)   Ideal body weight: 77.6 kg (171 lb 1.2 oz)  Adjusted ideal body weight: 79.6 kg (175 lb 6.4 oz)  Body mass index is 24.67 kg/m².    Results from last 7 days   Lab Units 20  1257 20  0933 20  1851 20  1540   VANCOMYCIN PK mcg/mL  --  16.60*  --  13.50*   VANCOMYCIN TR mcg/mL 12.00 16.30 9.20  --      Results from last 7 days   Lab Units 20  0933 20  0451 20  0606   CREATININE mg/dL 0.87 1.12 1.03     Estimated Creatinine Clearance: 123.8 mL/min (by C-G formula based on SCr of 0.87 mg/dL).  I/O last 3 completed shifts:  In: 1590 [P.O.:1340; IV Piggyback:250]  Out: -     Results from last 7 days   Lab Units 20  0933 20  0451 20  0607   WBC 10*3/mm3 14.40* 15.30* 14.30*     Temperature    20 1939 20 0419 20 1313   Temp: 98.8 °F (37.1 °C) 99 °F (37.2 °C) 98.3 °F (36.8 °C)     Baseline culture/source/susceptibility:  Microbiology Results (last 10 days)       Procedure Component Value - Date/Time    Blood Culture - Blood, Arm, Right [[id number]] Collected:  20 172    Lab Status:  Preliminary result Specimen:  Blood from Arm, Right Updated:  20 1730     " Blood Culture No growth at 2 days    Wound Culture - Swab, Knee, Right [[id number]]  (Abnormal)  (Susceptibility) Collected:  05/02/20 1720    Lab Status:  Final result Specimen:  Swab from Knee, Right Updated:  05/04/20 1242     Wound Culture Light growth (2+) Staphylococcus aureus, MRSA     Comment:   Methicillin resistant Staphylococcus aureus, Patient may be an isolation risk.        Gram Stain Occasional WBCs seen      No organisms seen    Susceptibility        Staphylococcus aureus, MRSA     ABEL     Clindamycin Susceptible     Erythromycin Resistant     Inducible Clindamycin Resistance Negative     Oxacillin Resistant     Penicillin G Resistant     Rifampin Susceptible     Tetracycline Susceptible     Trimethoprim + Sulfamethoxazole Susceptible     Vancomycin Susceptible                  Susceptibility Comments       Staphylococcus aureus, MRSA    This isolate does not demonstrate inducible clindamycin resistance in vitro.                 Blood Culture - Blood, Arm, Left [[id number]] Collected:  05/02/20 1719    Lab Status:  Preliminary result Specimen:  Blood from Arm, Left Updated:  05/04/20 1730     Blood Culture No growth at 2 days             Anti-Infectives (From admission, onward)      Ordered     Dose/Rate Route Frequency Start Stop    05/05/20 1210  !Vancomycin Level Draw Needed     Ordering Provider:  Liat Staples MD     Does not apply Once 05/05/20 1211      05/04/20 0911  !Vancomycin Level Draw Needed     Ordering Provider:  Liat Staples MD     Does not apply Once 05/05/20 1100      05/04/20 0911  !Vancomycin Level Draw Needed     Ordering Provider:  Liat Staples MD     Does not apply Once 05/05/20 0800      05/03/20 2007  vancomycin 1250 mg/250 mL 0.9% NS IVPB ([facility])  Review   Ordering Provider:  Duncan Joy PA    1,250 mg  over 90 Minutes Intravenous Every 8 Hours 05/04/20 0400 05/10/20 0359    05/02/20 2053  !Vancomycin Level Draw Needed     Ordering Provider:  Rufino  PATY Song     Does not apply Once 05/03/20 1900      05/02/20 2053  !Vancomycin Level Draw Needed     Ordering Provider:  Duncan Joy PA     Does not apply Once 05/03/20 1500 05/03/20 1612    05/02/20 2041  Pharmacy to dose vancomycin  Review   Ordering Provider:  Duncan Joy PA     Does not apply Continuous PRN 05/02/20 2041 05/09/20 2040    05/02/20 1708  piperacillin-tazobactam (ZOSYN) IVPB 3.375 g in 100 mL NS (CD)     Ordering Provider:  Jenna Duvall PA    3.375 g  over 30 Minutes Intravenous Once 05/02/20 1710 05/02/20 1803    05/02/20 1708  vancomycin IVPB 1750 mg in 0.9% Sodium Chloride (premix) 500 mL     Ordering Provider:  Jenna Duvall PA    20 mg/kg × 83.8 kg Intravenous Once 05/02/20 1710 05/02/20 1803            Remy Kahn, PharmD  5/5/20 14:25

## 2020-05-05 NOTE — PROGRESS NOTES
[facility] Medicine Services Daily Progress Note      Hospitalist Team  LOS 1 days      Patient Care Team:  Provider, No Known as PCP - General    Patient Location: 378/1      Subjective   Subjective   Still noted to have significant redness and swelling involving the knee, slightly improved, denies for any other active complaint.     Chief Complaint / Subjective  Chief Complaint   Patient presents with   • Knee Pain         Brief Synopsis of Hospital Course/HPI    44-year-old man with history of tobacco dependence.  Presented to the emergency room with complaints of right knee pain that has been going on for the past several days.  Stated initially noticing a boil on the anterior aspect of his right knee then subsequently the knee became swollen, red and painful and this has progressively worsened.  Rates the pain of moderate intensity worsened with movements.  Denies any associated nausea, vomiting or chills nor fever.    Patient stated  initially going to [facility] 4 days ago and was given Rocephin shot, anti-inflammatory agent and discharged on Bactrim.    Symptoms continued and he presented at an urgent care facility on 5/2/2020 and was advised to come to the emergency room for further evaluation.          Date::    5/3  Patient seen and examined  Stated right knee pain somewhat improving  On IV antibiotics        Review of Systems   Constitution: Negative for chills and fever.   HENT: Negative for congestion.    Eyes: Negative for blurred vision.   Cardiovascular: Negative for chest pain.   Respiratory: Negative for cough and shortness of breath.    Endocrine: Negative for cold intolerance and heat intolerance.   Gastrointestinal: Negative for abdominal pain, nausea and vomiting.   Genitourinary: Negative for dysuria.   Neurological: Negative for focal weakness.   Psychiatric/Behavioral: Negative for altered mental status.   All other systems reviewed and are  "negative.        Objective   Objective      Vital Signs  Temp:  [98.5 °F (36.9 °C)-99 °F (37.2 °C)] 99 °F (37.2 °C)  Heart Rate:  [85-87] 87  Resp:  [15-19] 17  BP: (131-153)/(80-97) 131/81  Oxygen Therapy  SpO2: 96 %  Pulse Oximetry Type: Intermittent  Device (Oxygen Therapy): room air  Device (Oxygen Therapy): room air  Flowsheet Rows      First Filed Value   Admission Height  182.9 cm (72\") Documented at 05/02/2020 1648   Admission Weight  83.8 kg (184 lb 11.9 oz) Documented at 05/02/2020 1648        Intake & Output (last 3 days)       05/02 0701 - 05/03 0700 05/03 0701 - 05/04 0700 05/04 0701 - 05/05 0700 05/05 0701 - 05/06 0700    P.O.  480 1340     IV Piggyback 450  250     Total Intake(mL/kg) 450 (5.5) 480 (5.8) 1590 (19.3)     Net +450 +480 +1590             Urine Unmeasured Occurrence  1 x          Lines, Drains & Airways    Active LDAs     Name:   Placement date:   Placement time:   Site:   Days:    Peripheral IV 05/02/20 1721 Left Antecubital   05/02/20    1721    Antecubital   less than 1                  Physical Exam:    Physical Exam   Constitutional: He is oriented to person, place, and time. He appears well-developed. No distress.   HENT:   Head: Normocephalic and atraumatic.   Eyes: Pupils are equal, round, and reactive to light. EOM are normal.   Neck: Neck supple.   Cardiovascular: Normal rate, regular rhythm and normal heart sounds.   Pulmonary/Chest: Effort normal and breath sounds normal. No respiratory distress.   Abdominal: Soft. Bowel sounds are normal. There is no tenderness.   Musculoskeletal: He exhibits no edema.   Neurological: He is alert and oriented to person, place, and time. No cranial nerve deficit.   Skin:   Anterior aspect of the right knee- crusted skin lesion with surrounding erythema, warmth to touch and also tender to touch       Vitals reviewed.           Wounds (last 24 hours)      LDA Wound     Row Name 05/05/20 0741 05/04/20 1901 05/04/20 1456       Wound 05/02/20 2000 " Right anterior knee Abcess    Wound - Properties Group Date first assessed: 05/02/20  - Time first assessed: 2000  - Present on Hospital Admission: Y  - Side: Right  - Orientation: anterior  - Location: knee  - Primary Wound Type: Abcess  -MC    Dressing Appearance  moist drainage  -AF  moist drainage  -KT  moist drainage  -AF    Closure  Open to air  -AF  Open to air  -KT  Open to air  -AF    Base  moist;red;scab  -AF  moist;red;scab  -KT  moist;red;scab  -AF    Care, Wound  --  --  cleansed with;sterile normal saline  -AF      User Key  (r) = Recorded By, (t) = Taken By, (c) = Cosigned By    Initials Name Provider Type    AF Kristin Odell, RN Registered Nurse    Otilia Costello RN Registered Nurse    [place] LPN Licensed Nurse          Procedures:              Results Review:     I reviewed the patient's new clinical results.      Lab Results (last 24 hours)     Procedure Component Value Units Date/Time    Vancomycin, Peak [[id number]]  (Abnormal) Collected:  05/05/20 0933    Specimen:  Blood Updated:  05/05/20 1009     Vancomycin Peak 16.60 mcg/mL     Basic Metabolic Panel [[id number]]  (Abnormal) Collected:  05/05/20 0933    Specimen:  Blood Updated:  05/05/20 1009     Glucose 136 mg/dL      BUN 12 mg/dL      Creatinine 0.87 mg/dL      Sodium 137 mmol/L      Potassium 4.5 mmol/L      Chloride 102 mmol/L      CO2 23.0 mmol/L      Calcium 9.3 mg/dL      eGFR Non African Amer 94 mL/min/1.73      BUN/Creatinine Ratio 13.8     Anion Gap 12.0 mmol/L     Narrative:       GFR Normal >60  Chronic Kidney Disease <60  Kidney Failure <15      Magnesium [[id number]]  (Normal) Collected:  05/05/20 0933    Specimen:  Blood Updated:  05/05/20 1009     Magnesium 2.3 mg/dL     Vancomycin, Trough [[id number]]  (Normal) Collected:  05/05/20 0933    Specimen:  Blood Updated:  05/05/20 1009     Vancomycin Trough 16.30 mcg/mL     CBC & Differential [[id number]] Collected:  05/05/20 0933    Specimen:   Blood Updated:  05/05/20 0949    Narrative:       The following orders were created for panel order CBC & Differential.  Procedure                               Abnormality         Status                     ---------                               -----------         ------                     CBC Auto Differential[[id number]]        Abnormal            Final result                 Please view results for these tests on the individual orders.    CBC Auto Differential [[id number]]  (Abnormal) Collected:  05/05/20 0933    Specimen:  Blood Updated:  05/05/20 0949     WBC 14.40 10*3/mm3      RBC 4.17 10*6/mm3      Hemoglobin 12.9 g/dL      Hematocrit 37.9 %      MCV 90.7 fL      MCH 30.9 pg      MCHC 34.1 g/dL      RDW 13.1 %      RDW-SD 42.0 fl      MPV 6.1 fL      Platelets 439 10*3/mm3      Neutrophil % 73.5 %      Lymphocyte % 14.3 %      Monocyte % 9.2 %      Eosinophil % 2.1 %      Basophil % 0.9 %      Neutrophils, Absolute 10.60 10*3/mm3      Lymphocytes, Absolute 2.10 10*3/mm3      Monocytes, Absolute 1.30 10*3/mm3      Eosinophils, Absolute 0.30 10*3/mm3      Basophils, Absolute 0.10 10*3/mm3      nRBC 0.1 /100 WBC     Blood Culture - Blood, Arm, Right [[id number]] Collected:  05/02/20 1724    Specimen:  Blood from Arm, Right Updated:  05/04/20 1730     Blood Culture No growth at 2 days    Blood Culture - Blood, Arm, Left [[id number]] Collected:  05/02/20 1719    Specimen:  Blood from Arm, Left Updated:  05/04/20 1730     Blood Culture No growth at 2 days    Wound Culture - Swab, Knee, Right [[id number]]  (Abnormal)  (Susceptibility) Collected:  05/02/20 1720    Specimen:  Swab from Knee, Right Updated:  05/04/20 1242     Wound Culture Light growth (2+) Staphylococcus aureus, MRSA     Comment:   Methicillin resistant Staphylococcus aureus, Patient may be an isolation risk.        Gram Stain Occasional WBCs seen      No organisms seen    Susceptibility      Staphylococcus aureus, MRSA     ABEL     Clindamycin  Susceptible     Erythromycin Resistant     Inducible Clindamycin Resistance Negative     Oxacillin Resistant     Penicillin G Resistant     Rifampin Susceptible     Tetracycline Susceptible     Trimethoprim + Sulfamethoxazole Susceptible     Vancomycin Susceptible                Susceptibility Comments     Staphylococcus aureus, MRSA    This isolate does not demonstrate inducible clindamycin resistance in vitro.                   No results found for: HGBA1C  Results from last 7 days   Lab Units 05/02/20  1719   INR  1.08           No results found for: LIPASE  No results found for: CHOL, CHLPL, TRIG, HDL, LDL, LDLDIRECT    No results found for: INTRAOP, PREDX, FINALDX, COMDX    Microbiology Results (last 10 days)     Procedure Component Value - Date/Time    Blood Culture - Blood, Arm, Right [[id number]] Collected:  05/02/20 1724    Lab Status:  Preliminary result Specimen:  Blood from Arm, Right Updated:  05/04/20 1730     Blood Culture No growth at 2 days    Wound Culture - Swab, Knee, Right [[id number]]  (Abnormal)  (Susceptibility) Collected:  05/02/20 1720    Lab Status:  Final result Specimen:  Swab from Knee, Right Updated:  05/04/20 1242     Wound Culture Light growth (2+) Staphylococcus aureus, MRSA     Comment:   Methicillin resistant Staphylococcus aureus, Patient may be an isolation risk.        Gram Stain Occasional WBCs seen      No organisms seen    Susceptibility      Staphylococcus aureus, MRSA     ABEL     Clindamycin Susceptible     Erythromycin Resistant     Inducible Clindamycin Resistance Negative     Oxacillin Resistant     Penicillin G Resistant     Rifampin Susceptible     Tetracycline Susceptible     Trimethoprim + Sulfamethoxazole Susceptible     Vancomycin Susceptible                Susceptibility Comments     Staphylococcus aureus, MRSA    This isolate does not demonstrate inducible clindamycin resistance in vitro.               Blood Culture - Blood, Arm, Left [[id number]] Collected:   05/02/20 1719    Lab Status:  Preliminary result Specimen:  Blood from Arm, Left Updated:  05/04/20 1730     Blood Culture No growth at 2 days          ECG/EMG Results (most recent)     None                    Xr Knee 1 Or 2 View Right    Result Date: 5/3/2020  Diffuse soft tissue swelling involves the right lower extremity. It is nonspecific. Cellulitis would be in the differential diagnosis.  No subcutaneous emphysema is seen.  No retained radiopaque foreign body is identified.  No acute fracture or acute malalignment.  No definite radiographic evidence for osteomyelitis.  A small right knee joint effusion is suspected.  Electronically Signed By-Dr. Pascual Day MD On:5/3/2020 4:01 AM This report was finalized on [id number] by Dr. Pascual Day MD.          Xrays, labs reviewed personally by physician.    Medication Review:   I have reviewed the patient's current medication list      Scheduled Meds    !Vancomycin Level Draw Needed  Does not apply Once   !Vancomycin Level Draw Needed  Does not apply Once   !Vancomycin Level Draw Needed  Does not apply Once   !Vancomycin Level Draw Needed  Does not apply Once   nicotine 1 patch Transdermal Nightly   sodium chloride 10 mL Intravenous Q12H   vancomycin 1,250 mg Intravenous Q8H       Meds Infusions    Pharmacy to dose vancomycin        Meds PRN  acetaminophen **OR** acetaminophen **OR** acetaminophen  •  aluminum-magnesium hydroxide-simethicone  •  bisacodyl  •  ketorolac  •  magnesium hydroxide  •  magnesium sulfate **OR** magnesium sulfate in D5W 1g/100mL (PREMIX)  •  melatonin  •  ondansetron **OR** ondansetron  •  Pharmacy to dose vancomycin  •  potassium chloride  •  [COMPLETED] Insert peripheral IV **AND** sodium chloride  •  sodium chloride    I personally reviewed patient's x-ray films   I personally reviewed patient's EKG strips     Assessment/Plan   Assessment/Plan     Active Hospital Problems    Diagnosis  POA   • **Cellulitis of right lower extremity  [L03.115]  Yes   • COPD (chronic obstructive pulmonary disease) (CMS/[facility]) [J44.9]  Yes   • Tobacco dependence syndrome [F17.200]  Yes      Resolved Hospital Problems   No resolved problems to display.       MEDICAL DECISION MAKING COMPLEXITY BY PROBLEM:     Right anterior knee cellulitis improving.  With significant inflammatory markers-elevated C-reactive protein and sed rate  Positive leukocytosis  X-ray right knee-diffuse soft tissue swelling with small right knee joint effusion suspected  Wound culture obtained-pending  Has a history of MRSA  Continue on IV vancomycin pharmacy to dose ... Wound culture positive for MRSA   Monitor redness and swelling involving the right knee area.  Will ask for MRI knee and orthopaedic consult.        Tobacco abuse  Encourage cessation          VTE Prophylaxis - SCDs      Mechanical Order History:      Ordered        05/02/20 2041  Place Sequential Compression Device  Once         05/02/20 2041  Maintain Sequential Compression Device  Continuous                 Pharmalogical Order History:     None            Code Status -   Code Status and Medical Interventions:   Ordered at: 05/02/20 1924     Code Status:    CPR     Medical Interventions (Level of Support Prior to Arrest):    Full           Discharge Planning          Destination      Coordination has not been started for this encounter.      Durable Medical Equipment      Coordination has not been started for this encounter.      Dialysis/Infusion      Coordination has not been started for this encounter.      Home Medical Care      Coordination has not been started for this encounter.      Therapy      Coordination has not been started for this encounter.      Community Resources      Coordination has not been started for this encounter.            Electronically signed by Liat Staples MD, 05/05/20, 12:30.  [facility] Hospitalist Team

## 2020-05-05 NOTE — PROGRESS NOTES
Discharge Planning Assessment   Jan     Patient Name: Acosta Howard  MRN: [id number]  Today's Date: 5/5/2020    Admit Date: 5/2/2020          Plan    Plan Comments  -- barriers to discharge is pending mri of right knee       Carol naegele rn  Case management  Office number [phone]  Cell phone [phone]

## 2020-05-05 NOTE — CONSULTS
Referring Provider: Jhoan  Reason for Consultation: Right prepatellar abscess    Patient Care Team:  Provider, No Known as PCP - General      Subjective .     History of present illness:  Acosta Howard is a 44 y.o. male who presents with right prepatellar abscess for 2 weeks.  Treated with Bactrim and anti-inflammatory for 2 weeks with started draining about 6 days ago.  [place] today with drainage and painful knee.  Has had several other MRSA abscesses in the past on his arms.  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  No chest pain shortness of breath fevers or chills.     Review of Systems      History  Past Medical History:   Diagnosis Date   • COPD (chronic obstructive pulmonary disease) (CMS/[facility]) 5/2/2020   • Tobacco dependence syndrome 1/26/2017     No past surgical history on file.  Family History   Problem Relation Age of Onset   • Bone cancer Mother    • Diabetes Father    • Stroke Father       Social History     Tobacco Use   • Smoking status: Current Every Day Smoker     Packs/day: 1.00     Years: 20.00     Pack years: 20.00   • Smokeless tobacco: Never Used   Substance Use Topics   • Alcohol use: Yes     Comment: very rare   • Drug use: Yes     Types: Marijuana     Comment: on occassion     Medications Prior to Admission   Medication Sig Dispense Refill Last Dose   • Multiple Vitamins-Minerals (MULTIVITAMIN WITH MINERALS) tablet tablet Take 1 tablet by mouth Daily.      • sulfamethoxazole-trimethoprim (BACTRIM DS,SEPTRA DS) 800-160 MG per tablet Take 1 tablet by mouth 2 (Two) Times a Day.         Patient has no known allergies.    Scheduled Meds:  !Vancomycin Level Draw Needed  Does not apply Once   !Vancomycin Level Draw Needed  Does not apply Once   !Vancomycin Level Draw Needed  Does not apply Once   !Vancomycin Level Draw Needed  Does not apply Once   [START ON 5/8/2020] !Vancomycin Level Draw Needed  Does not apply Once   nicotine 1 patch Transdermal  Nightly   sodium chloride 10 mL Intravenous Q12H   vancomycin 1,250 mg Intravenous Q8H     Continuous Infusions:  Pharmacy to dose vancomycin      PRN Meds:.acetaminophen **OR** acetaminophen **OR** acetaminophen  •  aluminum-magnesium hydroxide-simethicone  •  bisacodyl  •  ketorolac  •  magnesium hydroxide  •  magnesium sulfate **OR** magnesium sulfate in D5W 1g/100mL (PREMIX)  •  melatonin  •  ondansetron **OR** ondansetron  •  Pharmacy to dose vancomycin  •  potassium chloride  •  [COMPLETED] Insert peripheral IV **AND** sodium chloride  •  sodium chloride    Objective     Vital Signs   Vitals:    05/04/20 1358 05/04/20 1939 05/05/20 0419 05/05/20 1313   BP: 153/97 133/80 131/81 149/88   BP Location: Right arm Left arm Left arm Left arm   Patient Position: Lying Lying Lying Lying   Pulse: 85 85 87 84   Resp: 15 19 17 15   Temp: 98.5 °F (36.9 °C) 98.8 °F (37.1 °C) 99 °F (37.2 °C) 98.3 °F (36.8 °C)   TempSrc: Oral Oral Oral Oral   SpO2: 96% 96% 96% 98%   Weight:       Height:             Physical Exam:   Pleasant male, no apparent distress, alert and O x3, normal height and weight  Right knee shows prepatellar abscess with bruising and some mild range.  No effusion.  Tender with motion.  Stable.      Results Review:   I reviewed the patient's new clinical results.  I reviewed the patient's new imaging results    Lab Results (last 24 hours)     Procedure Component Value Units Date/Time    Blood Culture - Blood, Arm, Right [[id number]] Collected:  05/02/20 1724    Specimen:  Blood from Arm, Right Updated:  05/05/20 1731     Blood Culture No growth at 3 days    Blood Culture - Blood, Arm, Left [[id number]] Collected:  05/02/20 1719    Specimen:  Blood from Arm, Left Updated:  05/05/20 1731     Blood Culture No growth at 3 days    Vancomycin, Trough [[id number]]  (Normal) Collected:  05/05/20 1257    Specimen:  Blood Updated:  05/05/20 1356     Vancomycin Trough 12.00 mcg/mL     Vancomycin, Peak [[id number]]  (Abnormal)  Collected:  05/05/20 0933    Specimen:  Blood Updated:  05/05/20 1009     Vancomycin Peak 16.60 mcg/mL     Basic Metabolic Panel [[id number]]  (Abnormal) Collected:  05/05/20 0933    Specimen:  Blood Updated:  05/05/20 1009     Glucose 136 mg/dL      BUN 12 mg/dL      Creatinine 0.87 mg/dL      Sodium 137 mmol/L      Potassium 4.5 mmol/L      Chloride 102 mmol/L      CO2 23.0 mmol/L      Calcium 9.3 mg/dL      eGFR Non African Amer 94 mL/min/1.73      BUN/Creatinine Ratio 13.8     Anion Gap 12.0 mmol/L     Narrative:       GFR Normal >60  Chronic Kidney Disease <60  Kidney Failure <15      Magnesium [[id number]]  (Normal) Collected:  05/05/20 0933    Specimen:  Blood Updated:  05/05/20 1009     Magnesium 2.3 mg/dL     Vancomycin, Trough [[id number]]  (Normal) Collected:  05/05/20 0933    Specimen:  Blood Updated:  05/05/20 1009     Vancomycin Trough 16.30 mcg/mL     CBC & Differential [[id number]] Collected:  05/05/20 0933    Specimen:  Blood Updated:  05/05/20 0949    Narrative:       The following orders were created for panel order CBC & Differential.  Procedure                               Abnormality         Status                     ---------                               -----------         ------                     CBC Auto Differential[[id number]]        Abnormal            Final result                 Please view results for these tests on the individual orders.    CBC Auto Differential [[id number]]  (Abnormal) Collected:  05/05/20 0933    Specimen:  Blood Updated:  05/05/20 0949     WBC 14.40 10*3/mm3      RBC 4.17 10*6/mm3      Hemoglobin 12.9 g/dL      Hematocrit 37.9 %      MCV 90.7 fL      MCH 30.9 pg      MCHC 34.1 g/dL      RDW 13.1 %      RDW-SD 42.0 fl      MPV 6.1 fL      Platelets 439 10*3/mm3      Neutrophil % 73.5 %      Lymphocyte % 14.3 %      Monocyte % 9.2 %      Eosinophil % 2.1 %      Basophil % 0.9 %      Neutrophils, Absolute 10.60 10*3/mm3      Lymphocytes, Absolute 2.10 10*3/mm3       Monocytes, Absolute 1.30 10*3/mm3      Eosinophils, Absolute 0.30 10*3/mm3      Basophils, Absolute 0.10 10*3/mm3      nRBC 0.1 /100 WBC           Imaging Results (Last 24 Hours)     Procedure Component Value Units Date/Time    MRI Knee Right Without Contrast [[id number]] Collected:  05/05/20 1544     Updated:  05/05/20 1556    Narrative:       MRI KNEE RIGHT  WO CONTRAST    Date of Exam: 5/5/2020 15:02 EDT    Indication: Knee erythema, swelling, cellulitis suspected.  Right knee infection for 9 to 10 months. Worsening over 3-4 weeks.     Comparison Exams: None available.    Technique: Multi signal multi sequential MR images of the knee were obtained without contrast.    FINDINGS:  The cruciate ligaments, collateral ligaments, and extensor mechanism of the knee are intact.     There is abnormal signal at the peripheral lateral margin of the posterior horn of lateral meniscus. The findings suggest the presence of a peripheral meniscal tear. There is focal fluid signal seen on the posterior margin at this location suggesting a   para meniscal cyst. This finding measures approximately 8 mm.    There is evidence for a large horizontal type meniscal tear involving the posterior horn and body segment of the medial meniscus. The tear extends through the inferior articular surface. There is a displaced fragment which is shifted towards the inferior   margin of the medial gutter along the undersurface of the body segment.    No joint effusion is observed. No significant articular cartilage defects are identified. There is no evidence for osteochondral injury. No displaced osteochondral fragments are noted. No abnormal focal bone marrow signal is seen. The cortical margins   are intact.    There is extensive abnormal edema within the subcutaneous soft tissues surrounding the knee. These findings are more pronounced along the anterior and lateral aspect. There is evidence for a complex appearing fluid collection within the  subcutaneous soft   tissues along the anterior margin of the patella and patellar tendon. This finding is noted more towards the lateral aspect. The finding measures 5.4 x 7.2 cm in transverse by craniocaudal dimensions. Given the clinical presentation, these findings   suggest extensive changes of soft tissue cellulitis with developing abscess formation. Changes of a prepatellar bursitis and reactive edema could also have this appearance.    There is also mild edema within the underlying muscular soft tissues at the superior margin of the lower leg at the anterolateral aspect along the anterior compartment musculature at the superior margin. There is also edema associated with the fascial   plane at the posterior margin of the superior portion of the right calf or right lower leg. The findings suggest potential spread of infection across the fascial planes with underlying pyomyositis.      Impression:       1.Evidence for a peripheral tear of the posterior horn of lateral meniscus. There is evidence for associated para meniscal cyst measuring 8 mm.  2.Evidence for large horizontal type meniscal tear involving the posterior horn and body segment of the medial meniscus. There is a displaced meniscal fragment which extends into the inferior margin of the medial gutter.  3.Extensive abnormal edema throughout the soft tissues overlying the knee most pronounced at the anterior lateral aspect. A complex fluid collection is noted. Given the clinical presentation, the findings suggest changes of soft tissue cellulitis with   abscess formation.  4.There is also evidence for potential spread of infection across fascial planes with involvement of the underlying muscular soft tissues of the superior right calf or underlying pyomyositis.  5.No evidence for osteomyelitis.    Electronically Signed: Izaiah Foster MD 5/5/2020 15:54 EDT            Assessment/Plan     Right prepatellar abscess  Found to have meniscal tears daily and  laterally but seem to be asymptomatic    Incision and drainage with wound packing tomorrow morning.  Patient understands risk.  These include bleeding, infection, damage to nerves or blood vessels, possible recurrence, possible need for further surgery, and the risk of medical or anesthetic complications including risk of death.      Negro Longoria MD  05/05/20  17:45

## 2020-05-05 NOTE — PLAN OF CARE
Problem: Patient Care Overview  Goal: Plan of Care Review  Outcome: Ongoing (interventions implemented as appropriate)  Flowsheets (Taken 5/5/2020 0300)  Progress: no change  Plan of Care Reviewed With: patient  Outcome Summary: Patient resting in bed with no complaints of pain. He gets up ad madiha and is getting IV abx. Will continue to monitor patient

## 2020-05-06 ENCOUNTER — ANESTHESIA EVENT (OUTPATIENT)
Dept: PERIOP | Facility: HOSPITAL | Age: 45
End: 2020-05-06

## 2020-05-06 ENCOUNTER — ANESTHESIA (OUTPATIENT)
Dept: PERIOP | Facility: HOSPITAL | Age: 45
End: 2020-05-06

## 2020-05-06 LAB
ABO GROUP BLD: NORMAL
APTT PPP: 25.4 SECONDS (ref 24–31)
BLD GP AB SCN SERPL QL: NEGATIVE
INR PPP: 0.97 (ref 0.9–1.1)
MAGNESIUM SERPL-MCNC: 2.1 MG/DL (ref 1.6–2.6)
POTASSIUM BLD-SCNC: 4.6 MMOL/L (ref 3.5–5.2)
PROTHROMBIN TIME: 10.2 SECONDS (ref 9.6–11.7)
RH BLD: POSITIVE
T&S EXPIRATION DATE: NORMAL

## 2020-05-06 PROCEDURE — 25010000002 PROPOFOL 10 MG/ML EMULSION: Performed by: ANESTHESIOLOGY

## 2020-05-06 PROCEDURE — 86900 BLOOD TYPING SEROLOGIC ABO: CPT

## 2020-05-06 PROCEDURE — 85610 PROTHROMBIN TIME: CPT | Performed by: HOSPITALIST

## 2020-05-06 PROCEDURE — 25010000002 HYDROMORPHONE PER 4 MG: Performed by: ANESTHESIOLOGY

## 2020-05-06 PROCEDURE — 86850 RBC ANTIBODY SCREEN: CPT | Performed by: HOSPITALIST

## 2020-05-06 PROCEDURE — 25010000002 DEXAMETHASONE PER 1 MG: Performed by: ANESTHESIOLOGY

## 2020-05-06 PROCEDURE — 25010000002 VANCOMYCIN 10 G RECONSTITUTED SOLUTION: Performed by: INTERNAL MEDICINE

## 2020-05-06 PROCEDURE — 99232 SBSQ HOSP IP/OBS MODERATE 35: CPT | Performed by: HOSPITALIST

## 2020-05-06 PROCEDURE — 25010000002 ONDANSETRON PER 1 MG: Performed by: ANESTHESIOLOGY

## 2020-05-06 PROCEDURE — 87147 CULTURE TYPE IMMUNOLOGIC: CPT | Performed by: INTERNAL MEDICINE

## 2020-05-06 PROCEDURE — 25010000002 FENTANYL CITRATE (PF) 100 MCG/2ML SOLUTION: Performed by: ANESTHESIOLOGY

## 2020-05-06 PROCEDURE — 85730 THROMBOPLASTIN TIME PARTIAL: CPT | Performed by: HOSPITALIST

## 2020-05-06 PROCEDURE — 86901 BLOOD TYPING SEROLOGIC RH(D): CPT

## 2020-05-06 PROCEDURE — 87186 SC STD MICRODIL/AGAR DIL: CPT | Performed by: INTERNAL MEDICINE

## 2020-05-06 PROCEDURE — 83735 ASSAY OF MAGNESIUM: CPT | Performed by: PHYSICIAN ASSISTANT

## 2020-05-06 PROCEDURE — 87205 SMEAR GRAM STAIN: CPT | Performed by: INTERNAL MEDICINE

## 2020-05-06 PROCEDURE — 86901 BLOOD TYPING SEROLOGIC RH(D): CPT | Performed by: HOSPITALIST

## 2020-05-06 PROCEDURE — 84132 ASSAY OF SERUM POTASSIUM: CPT | Performed by: PHYSICIAN ASSISTANT

## 2020-05-06 PROCEDURE — 25010000002 VANCOMYCIN 1 G RECONSTITUTED SOLUTION 1 EACH VIAL: Performed by: ANESTHESIOLOGY

## 2020-05-06 PROCEDURE — 25010000002 MIDAZOLAM PER 1 MG: Performed by: ANESTHESIOLOGY

## 2020-05-06 PROCEDURE — 86900 BLOOD TYPING SEROLOGIC ABO: CPT | Performed by: HOSPITALIST

## 2020-05-06 PROCEDURE — 87075 CULTR BACTERIA EXCEPT BLOOD: CPT | Performed by: INTERNAL MEDICINE

## 2020-05-06 PROCEDURE — 0MBN0ZZ EXCISION OF RIGHT KNEE BURSA AND LIGAMENT, OPEN APPROACH: ICD-10-PCS | Performed by: ORTHOPAEDIC SURGERY

## 2020-05-06 PROCEDURE — 87070 CULTURE OTHR SPECIMN AEROBIC: CPT | Performed by: INTERNAL MEDICINE

## 2020-05-06 PROCEDURE — 25010000002 ONDANSETRON PER 1 MG: Performed by: ORTHOPAEDIC SURGERY

## 2020-05-06 RX ORDER — HYDROMORPHONE HCL 110MG/55ML
1 PATIENT CONTROLLED ANALGESIA SYRINGE INTRAVENOUS
Status: DISCONTINUED | OUTPATIENT
Start: 2020-05-06 | End: 2020-05-06 | Stop reason: HOSPADM

## 2020-05-06 RX ORDER — SODIUM CHLORIDE, SODIUM LACTATE, POTASSIUM CHLORIDE, CALCIUM CHLORIDE 600; 310; 30; 20 MG/100ML; MG/100ML; MG/100ML; MG/100ML
INJECTION, SOLUTION INTRAVENOUS CONTINUOUS PRN
Status: DISCONTINUED | OUTPATIENT
Start: 2020-05-06 | End: 2020-05-06 | Stop reason: SURG

## 2020-05-06 RX ORDER — MIDAZOLAM HYDROCHLORIDE 1 MG/ML
INJECTION INTRAMUSCULAR; INTRAVENOUS AS NEEDED
Status: DISCONTINUED | OUTPATIENT
Start: 2020-05-06 | End: 2020-05-06 | Stop reason: SURG

## 2020-05-06 RX ORDER — FENTANYL CITRATE 50 UG/ML
50 INJECTION, SOLUTION INTRAMUSCULAR; INTRAVENOUS
Status: DISCONTINUED | OUTPATIENT
Start: 2020-05-06 | End: 2020-05-06 | Stop reason: HOSPADM

## 2020-05-06 RX ORDER — SODIUM CHLORIDE, SODIUM LACTATE, POTASSIUM CHLORIDE, CALCIUM CHLORIDE 600; 310; 30; 20 MG/100ML; MG/100ML; MG/100ML; MG/100ML
9 INJECTION, SOLUTION INTRAVENOUS CONTINUOUS PRN
Status: CANCELLED | OUTPATIENT
Start: 2020-05-06

## 2020-05-06 RX ORDER — FENTANYL CITRATE 50 UG/ML
INJECTION, SOLUTION INTRAMUSCULAR; INTRAVENOUS AS NEEDED
Status: DISCONTINUED | OUTPATIENT
Start: 2020-05-06 | End: 2020-05-06 | Stop reason: SURG

## 2020-05-06 RX ORDER — ONDANSETRON 2 MG/ML
4 INJECTION INTRAMUSCULAR; INTRAVENOUS EVERY 6 HOURS PRN
Status: DISCONTINUED | OUTPATIENT
Start: 2020-05-06 | End: 2020-05-08 | Stop reason: HOSPADM

## 2020-05-06 RX ORDER — SODIUM CHLORIDE 0.9 % (FLUSH) 0.9 %
10 SYRINGE (ML) INJECTION EVERY 12 HOURS SCHEDULED
Status: CANCELLED | OUTPATIENT
Start: 2020-05-06

## 2020-05-06 RX ORDER — ONDANSETRON 2 MG/ML
INJECTION INTRAMUSCULAR; INTRAVENOUS AS NEEDED
Status: DISCONTINUED | OUTPATIENT
Start: 2020-05-06 | End: 2020-05-06 | Stop reason: SURG

## 2020-05-06 RX ORDER — ONDANSETRON 4 MG/1
4 TABLET, FILM COATED ORAL EVERY 6 HOURS PRN
Status: DISCONTINUED | OUTPATIENT
Start: 2020-05-06 | End: 2020-05-08 | Stop reason: HOSPADM

## 2020-05-06 RX ORDER — SODIUM CHLORIDE 0.9 % (FLUSH) 0.9 %
1-10 SYRINGE (ML) INJECTION AS NEEDED
Status: DISCONTINUED | OUTPATIENT
Start: 2020-05-06 | End: 2020-05-08 | Stop reason: HOSPADM

## 2020-05-06 RX ORDER — PROPOFOL 10 MG/ML
VIAL (ML) INTRAVENOUS AS NEEDED
Status: DISCONTINUED | OUTPATIENT
Start: 2020-05-06 | End: 2020-05-06 | Stop reason: SURG

## 2020-05-06 RX ORDER — MORPHINE SULFATE 4 MG/ML
4 INJECTION, SOLUTION INTRAMUSCULAR; INTRAVENOUS
Status: DISCONTINUED | OUTPATIENT
Start: 2020-05-06 | End: 2020-05-08 | Stop reason: HOSPADM

## 2020-05-06 RX ORDER — SODIUM CHLORIDE 0.9 % (FLUSH) 0.9 %
10 SYRINGE (ML) INJECTION AS NEEDED
Status: CANCELLED | OUTPATIENT
Start: 2020-05-06

## 2020-05-06 RX ORDER — SODIUM CHLORIDE 9 MG/ML
125 INJECTION, SOLUTION INTRAVENOUS CONTINUOUS
Status: DISPENSED | OUTPATIENT
Start: 2020-05-06 | End: 2020-05-07

## 2020-05-06 RX ORDER — ACETAMINOPHEN 325 MG/1
325 TABLET ORAL EVERY 4 HOURS PRN
Status: DISCONTINUED | OUTPATIENT
Start: 2020-05-06 | End: 2020-05-08 | Stop reason: HOSPADM

## 2020-05-06 RX ORDER — MEPERIDINE HYDROCHLORIDE 25 MG/ML
12.5 INJECTION INTRAMUSCULAR; INTRAVENOUS; SUBCUTANEOUS
Status: DISCONTINUED | OUTPATIENT
Start: 2020-05-06 | End: 2020-05-06 | Stop reason: HOSPADM

## 2020-05-06 RX ORDER — OXYCODONE HYDROCHLORIDE 5 MG/1
10 TABLET ORAL EVERY 4 HOURS PRN
Status: DISCONTINUED | OUTPATIENT
Start: 2020-05-06 | End: 2020-05-08 | Stop reason: HOSPADM

## 2020-05-06 RX ORDER — HYDROCODONE BITARTRATE AND ACETAMINOPHEN 7.5; 325 MG/1; MG/1
1 TABLET ORAL EVERY 4 HOURS PRN
Status: DISCONTINUED | OUTPATIENT
Start: 2020-05-06 | End: 2020-05-08 | Stop reason: HOSPADM

## 2020-05-06 RX ORDER — ONDANSETRON 2 MG/ML
4 INJECTION INTRAMUSCULAR; INTRAVENOUS ONCE AS NEEDED
Status: DISCONTINUED | OUTPATIENT
Start: 2020-05-06 | End: 2020-05-06 | Stop reason: HOSPADM

## 2020-05-06 RX ORDER — NALOXONE HCL 0.4 MG/ML
0.4 VIAL (ML) INJECTION
Status: DISCONTINUED | OUTPATIENT
Start: 2020-05-06 | End: 2020-05-08 | Stop reason: HOSPADM

## 2020-05-06 RX ORDER — DEXAMETHASONE SODIUM PHOSPHATE 4 MG/ML
INJECTION, SOLUTION INTRA-ARTICULAR; INTRALESIONAL; INTRAMUSCULAR; INTRAVENOUS; SOFT TISSUE AS NEEDED
Status: DISCONTINUED | OUTPATIENT
Start: 2020-05-06 | End: 2020-05-06 | Stop reason: SURG

## 2020-05-06 RX ORDER — ASPIRIN 325 MG
325 TABLET, DELAYED RELEASE (ENTERIC COATED) ORAL DAILY
Status: DISCONTINUED | OUTPATIENT
Start: 2020-05-07 | End: 2020-05-08 | Stop reason: HOSPADM

## 2020-05-06 RX ADMIN — MIDAZOLAM 2 MG: 1 INJECTION INTRAMUSCULAR; INTRAVENOUS at 07:02

## 2020-05-06 RX ADMIN — PROPOFOL 200 MG: 10 INJECTION, EMULSION INTRAVENOUS at 07:03

## 2020-05-06 RX ADMIN — HYDROMORPHONE HYDROCHLORIDE 1 MG: 2 INJECTION, SOLUTION INTRAMUSCULAR; INTRAVENOUS; SUBCUTANEOUS at 08:03

## 2020-05-06 RX ADMIN — VANCOMYCIN HYDROCHLORIDE 1250 MG: 1 INJECTION, POWDER, LYOPHILIZED, FOR SOLUTION INTRAVENOUS at 07:12

## 2020-05-06 RX ADMIN — SODIUM CHLORIDE 1250 MG: 9 INJECTION, SOLUTION INTRAVENOUS at 22:46

## 2020-05-06 RX ADMIN — OXYCODONE HYDROCHLORIDE 10 MG: 5 TABLET ORAL at 15:04

## 2020-05-06 RX ADMIN — ONDANSETRON 4 MG: 2 INJECTION INTRAMUSCULAR; INTRAVENOUS at 07:12

## 2020-05-06 RX ADMIN — SODIUM CHLORIDE 125 ML/HR: 900 INJECTION, SOLUTION INTRAVENOUS at 22:15

## 2020-05-06 RX ADMIN — DEXAMETHASONE SODIUM PHOSPHATE 4 MG: 4 INJECTION, SOLUTION INTRAMUSCULAR; INTRAVENOUS at 07:12

## 2020-05-06 RX ADMIN — FENTANYL CITRATE 50 MCG: 50 INJECTION, SOLUTION INTRAMUSCULAR; INTRAVENOUS at 07:25

## 2020-05-06 RX ADMIN — SODIUM CHLORIDE 1250 MG: 9 INJECTION, SOLUTION INTRAVENOUS at 14:24

## 2020-05-06 RX ADMIN — FENTANYL CITRATE 50 MCG: 50 INJECTION, SOLUTION INTRAMUSCULAR; INTRAVENOUS at 07:20

## 2020-05-06 RX ADMIN — ONDANSETRON 4 MG: 2 INJECTION INTRAMUSCULAR; INTRAVENOUS at 11:00

## 2020-05-06 RX ADMIN — FENTANYL CITRATE 100 MCG: 50 INJECTION, SOLUTION INTRAMUSCULAR; INTRAVENOUS at 07:02

## 2020-05-06 RX ADMIN — SODIUM CHLORIDE, SODIUM LACTATE, POTASSIUM CHLORIDE, AND CALCIUM CHLORIDE: .6; .31; .03; .02 INJECTION, SOLUTION INTRAVENOUS at 06:58

## 2020-05-06 RX ADMIN — SODIUM CHLORIDE 125 ML/HR: 900 INJECTION, SOLUTION INTRAVENOUS at 09:26

## 2020-05-06 RX ADMIN — OXYCODONE HYDROCHLORIDE 10 MG: 5 TABLET ORAL at 22:22

## 2020-05-06 NOTE — ANESTHESIA PROCEDURE NOTES
Airway  Urgency: elective    Date/Time: 5/6/2020 7:04 AM  Airway not difficult    General Information and Staff    Patient location during procedure: OR  Anesthesiologist: Gómez Curiel MD    Indications and Patient Condition  Indications for airway management: airway protection    Preoxygenated: yes  Mask difficulty assessment: 0 - not attempted    Final Airway Details  Final airway type: supraglottic airway      Successful airway: LMA  Size 4    Number of attempts at approach: 1  Assessment: lips, teeth, and gum same as pre-op and atraumatic intubation

## 2020-05-06 NOTE — ANESTHESIA POSTPROCEDURE EVALUATION
Patient: Acosta Howard    Procedure Summary     Date:  05/06/20 Room / Location:  [facility] OR 11 / [facility] MAIN OR    Anesthesia Start:  0658 Anesthesia Stop:  0749    Procedure:  PATELLA BURSA EXCISION (Right Knee) Diagnosis:       Abscess of bursa, right knee      (Abscess of bursa, right knee [M71.061])    Surgeon:  Negro Longoria MD Provider:  Gómez Curiel MD    Anesthesia Type:  general ASA Status:  2          Anesthesia Type: general    Vitals  Vitals Value Taken Time   /69 5/6/2020  8:18 AM   Temp 97.4 °F (36.3 °C) 5/6/2020  7:51 AM   Pulse 70 5/6/2020  8:21 AM   Resp 10 5/6/2020  8:18 AM   SpO2 94 % 5/6/2020  8:21 AM   Vitals shown include unvalidated device data.        Post Anesthesia Care and Evaluation    Patient location during evaluation: PACU  Patient participation: complete - patient participated  Level of consciousness: awake  Pain scale: See nurse's notes for pain score.  Pain management: adequate  Airway patency: patent  Anesthetic complications: No anesthetic complications  PONV Status: none  Cardiovascular status: acceptable  Respiratory status: acceptable  Hydration status: acceptable    Comments: Patient seen and examined postoperatively; vital signs stable; SpO2 greater than or equal to 90%; cardiopulmonary status stable; nausea/vomiting adequately controlled; pain adequately controlled; no apparent anesthesia complications; patient discharged from anesthesia care when discharge criteria were met

## 2020-05-06 NOTE — PLAN OF CARE
Problem: Patient Care Overview  Goal: Plan of Care Review  Outcome: Ongoing (interventions implemented as appropriate)  Flowsheets (Taken 2020 1901)  Plan of Care Reviewed With: patient  Goal: Individualization and Mutuality  Outcome: Ongoing (interventions implemented as appropriate)  Goal: Discharge Needs Assessment  Outcome: Ongoing (interventions implemented as appropriate)  Goal: Interprofessional Rounds/Family Conf  Outcome: Ongoing (interventions implemented as appropriate)     Problem: Infection, Risk/Actual (Adult)  Goal: Identify Related Risk Factors and Signs and Symptoms  Description  Related risk factors and signs and symptoms are identified upon initiation of Human Response Clinical Practice Guideline (CPG).  Outcome: Ongoing (interventions implemented as appropriate)  Goal: Infection Prevention/Resolution  Description  Patient will demonstrate the desired outcomes by discharge/transition of care.  Outcome: Ongoing (interventions implemented as appropriate)     Problem: Skin and Soft Tissue Infection (Adult)  Description  Prevent and manage potential problems includin. infection progression  2. pain  3. situational response  Goal: Signs and Symptoms of Listed Potential Problems Will be Absent, Minimized or Managed (Skin and Soft Tissue Infection)  Description  Signs and symptoms of listed potential problems will be absent, minimized or managed by discharge/transition of care (reference Skin and Soft Tissue Infection (Adult) CPG).  Outcome: Ongoing (interventions implemented as appropriate)       Pt rested throughout the shift. Pt had no complaints. Pt NPO status initiated at midnight for I&D today with MD Longoria. IV Vancomycin continued. Will continue to monitor.

## 2020-05-06 NOTE — ANESTHESIA PREPROCEDURE EVALUATION
Anesthesia Evaluation     Patient summary reviewed and Nursing notes reviewed   NPO Solid Status: > 8 hours  NPO Liquid Status: > 8 hours           Airway   Mallampati: I  TM distance: >3 FB  Neck ROM: full  No difficulty expected  Dental - normal exam     Pulmonary - normal exam   (+) a smoker Current Abstained day of surgery, COPD,   Cardiovascular - negative cardio ROS and normal exam        Neuro/Psych- negative ROS  GI/Hepatic/Renal/Endo - negative ROS     Musculoskeletal (-) negative ROS    Abdominal  - normal exam    Bowel sounds: normal.   Substance History - negative use     OB/GYN negative ob/gyn ROS         Other                        Anesthesia Plan    ASA 2     general     intravenous induction     Anesthetic plan, all risks, benefits, and alternatives have been provided, discussed and informed consent has been obtained with: patient.

## 2020-05-06 NOTE — PLAN OF CARE
Problem: Patient Care Overview  Goal: Plan of Care Review  Outcome: Ongoing (interventions implemented as appropriate)  Flowsheets  Taken 5/6/2020 1528  Progress: no change  Outcome Summary: Patient had I & D this morning. Staff to change dressing every morning. Continue with IV Vanc. wound cx pending.  Taken 5/6/2020 0977  Plan of Care Reviewed With: patient  Goal: Discharge Needs Assessment  Outcome: Ongoing (interventions implemented as appropriate)  Flowsheets  Taken 5/4/2020 1628 by Naegele, Carol, RN  Equipment Currently Used at Home: none  Transportation Anticipated: car, drives self  Transportation Concerns: car, none  Readmission Within the Last 30 Days: no previous admission in last 30 days  Patient/Family Anticipated Services at Transition:   Patient/Family Anticipates Transition to: home with family  Taken 5/6/2020 1528 by Natalie Galindo RN  Concerns to be Addressed: no discharge needs identified  Goal: Interprofessional Rounds/Family Conf  Outcome: Ongoing (interventions implemented as appropriate)  Flowsheets (Taken 5/6/2020 1528)  Participants: nursing; patient; physician;      Problem: Infection, Risk/Actual (Adult)  Goal: Identify Related Risk Factors and Signs and Symptoms  Outcome: Ongoing (interventions implemented as appropriate)  Flowsheets (Taken 5/6/2020 1528)  Related Risk Factors (Infection, Risk/Actual): surgery/procedure; skin integrity impairment  Signs and Symptoms (Infection, Risk/Actual): pain  Goal: Infection Prevention/Resolution  Outcome: Ongoing (interventions implemented as appropriate)  Flowsheets (Taken 5/6/2020 1528)  Infection Prevention/Resolution: making progress toward outcome     Problem: Skin and Soft Tissue Infection (Adult)  Goal: Signs and Symptoms of Listed Potential Problems Will be Absent, Minimized or Managed (Skin and Soft Tissue Infection)  Outcome: Ongoing (interventions implemented as appropriate)  Flowsheets (Taken 5/6/2020  1528)  Problems Assessed (Skin and Soft Tissue Infection): all  Problems Present (Skin/Soft Tissue Inf): none

## 2020-05-06 NOTE — THERAPY DISCHARGE NOTE
Patient Name: Acosta Howard  : 1975    MRN: [id number]                              Today's Date: 2020       Admit Date: 2020    Visit Dx:     ICD-10-CM ICD-9-CM   1. Cellulitis of right lower extremity L03.115 682.6   2. Right knee pain, unspecified chronicity M25.561 719.46   3. Abscess of bursa, right knee M71.061 727.89     Patient Active Problem List   Diagnosis   • Cellulitis of right lower extremity   • COPD with exacerbation (CMS/[facility])   • Tobacco dependence syndrome   • COPD (chronic obstructive pulmonary disease) (CMS/[facility])   • Abscess of bursa, right knee     Past Medical History:   Diagnosis Date   • COPD (chronic obstructive pulmonary disease) (CMS/[facility]) 2020   • Tobacco dependence syndrome 2017     History reviewed. No pertinent surgical history.  General Information    No documentation.       Mobility    No documentation.       Obj/Interventions    No documentation.       Goals/Plan    No documentation.       Clinical Impression     Row Name 20 1613          Plan of Care Review    Plan of Care Reviewed With  patient  -CM     Outcome Summary  45 yo male s/p I&D of R knee w/ patellar bursa excision this morning. Pt declines PT eval, stating he is very familiar and feels comfortable using cxs at home. Has cxs at home. PT reviewed verbally how to properly ascend/descend stairs, as pt was not certain. No stairs at home. PT left rw for pt to use while in hospital. Will sign off.   -CM       User Key  (r) = Recorded By, (t) = Taken By, (c) = Cosigned By    Initials Name Provider Type    Tanesha Rodriguez, PT Physical Therapist        Outcome Measures    No documentation.         PT Recommendation and Plan     Plan of Care Reviewed With: patient  Outcome Summary: 45 yo male s/p I&D of R knee w/ patellar bursa excision this morning. Pt declines PT eval, stating he is very familiar and feels comfortable using cxs at home. Has cxs at home. PT reviewed verbally how to properly  ascend/descend stairs, as pt was not certain. No stairs at home. PT left rw for pt to use while in hospital. Will sign off.      Time Calculation:                   Tanesha Davison, PT  5/6/2020

## 2020-05-06 NOTE — PROGRESS NOTES
[facility] Medicine Services Daily Progress Note      Hospitalist Team  LOS 2 days      Patient Care Team:  Provider, No Known as PCP - General    Patient Location: 378/1      Subjective   Subjective   Leg covered with dressing, denies for any other active complaint.     Chief Complaint / Subjective  Chief Complaint   Patient presents with   • Knee Pain         Brief Synopsis of Hospital Course/HPI    44-year-old man with history of tobacco dependence.  Presented to the emergency room with complaints of right knee pain that has been going on for the past several days.  Stated initially noticing a boil on the anterior aspect of his right knee then subsequently the knee became swollen, red and painful and this has progressively worsened.  Rates the pain of moderate intensity worsened with movements.  Denies any associated nausea, vomiting or chills nor fever.    Patient stated  initially going to [facility] 4 days ago and was given Rocephin shot, anti-inflammatory agent and discharged on Bactrim.    Symptoms continued and he presented at an urgent care facility on 5/2/2020 and was advised to come to the emergency room for further evaluation.          Date::    5/3  Patient seen and examined  Stated right knee pain somewhat improving  On IV antibiotics        Review of Systems   Constitution: Negative for chills and fever.   HENT: Negative for congestion.    Eyes: Negative for blurred vision.   Cardiovascular: Negative for chest pain.   Respiratory: Negative for cough and shortness of breath.    Endocrine: Negative for cold intolerance and heat intolerance.   Gastrointestinal: Negative for abdominal pain, nausea and vomiting.   Genitourinary: Negative for dysuria.   Neurological: Negative for focal weakness.   Psychiatric/Behavioral: Negative for altered mental status.   All other systems reviewed and are negative.        Objective   Objective      Vital Signs  Temp:  [97.4 °F (36.3 °C)-98.8  "°F (37.1 °C)] 98.6 °F (37 °C)  Heart Rate:  [73-82] 74  Resp:  [10-22] 10  BP: (125-142)/(66-84) 125/69  Oxygen Therapy  SpO2: 94 %  Pulse Oximetry Type: Continuous  Device (Oxygen Therapy): room air  Device (Oxygen Therapy): room air  Flow (L/min): 4  Flowsheet Rows      First Filed Value   Admission Height  182.9 cm (72\") Documented at 05/02/2020 1648   Admission Weight  83.8 kg (184 lb 11.9 oz) Documented at 05/02/2020 1648        Intake & Output (last 3 days)       05/03 0701 - 05/04 0700 05/04 0701 - 05/05 0700 05/05 0701 - 05/06 0700 05/06 0701 - 05/07 0700    P.O. 480 1340 420 480    I.V. (mL/kg)    100 (1.2)    IV Piggyback  250      Total Intake(mL/kg) 480 (5.8) 1590 (19.3) 420 (5.1) 580 (7)    Net +480 +1590 +420 +580            Urine Unmeasured Occurrence 1 x           Lines, Drains & Airways    Active LDAs     Name:   Placement date:   Placement time:   Site:   Days:    Peripheral IV 05/02/20 1721 Left Antecubital   05/02/20    1721    Antecubital   less than 1                  Physical Exam:    Physical Exam   Constitutional: He is oriented to person, place, and time. He appears well-developed. No distress.   HENT:   Head: Normocephalic and atraumatic.   Eyes: Pupils are equal, round, and reactive to light. EOM are normal.   Neck: Neck supple.   Cardiovascular: Normal rate, regular rhythm and normal heart sounds.   Pulmonary/Chest: Effort normal and breath sounds normal. No respiratory distress.   Abdominal: Soft. Bowel sounds are normal. There is no tenderness.   Musculoskeletal: He exhibits no edema.   Neurological: He is alert and oriented to person, place, and time. No cranial nerve deficit.   Skin:   Anterior aspect of the right knee- crusted skin lesion with surrounding erythema, warmth to touch and also tender to touch       Vitals reviewed.           Wounds (last 24 hours)      LDA Wound     Row Name 05/06/20 0957 05/06/20 0824 05/06/20 0820       Wound 05/02/20 2000 Right anterior knee Abcess "    Wound - Properties Group Date first assessed: 05/02/20  - Time first assessed: 2000  - Present on Hospital Admission: Y  -MC Side: Right  -MC Orientation: anterior  -MC Location: knee  -MC Primary Wound Type: Abcess  -MC    Dressing Appearance  dry;intact  -VB  dry;intact  -LJ  dry;intact  -LJ    Closure  SVEN Kerlix in place   -VB  --  --    Base  dressing in place, unable to visualize  -VB  --  --       Wound 05/06/20 0700 Right anterior knee    Wound - Properties Group Date first assessed: 05/06/20  - Time first assessed: 0700  -CW Present on Hospital Admission: Y  -CW Side: Right  -CW Orientation: anterior  -CW Location: knee  -CW    Closure  Other (Comment) packed with 1/2 in ioban and covered with 4x4's and coban  -VB  --  --    Row Name 05/06/20 0806 05/06/20 0751 05/06/20 0741       Wound 05/02/20 2000 Right anterior knee Abcess    Wound - Properties Group Date first assessed: 05/02/20  - Time first assessed: 2000  - Present on Hospital Admission: Y  -MC Side: Right  -MC Orientation: anterior  -MC Location: knee  -MC Primary Wound Type: Abcess  -MC    Dressing Appearance  dry;intact  -LJ  dry;intact  -LJ  --       Wound 05/06/20 0700 Right anterior knee    Wound - Properties Group Date first assessed: 05/06/20  - Time first assessed: 0700  -CW Present on Hospital Admission: Y  -CW Side: Right  -CW Orientation: anterior  -CW Location: knee  -CW    Closure  --  --  Other (Comment) packed with 1/2 in ioban and covered with 4x4's and coban  -CW    Row Name 05/05/20 1901             Wound 05/02/20 2000 Right anterior knee Abcess    Wound - Properties Group Date first assessed: 05/02/20  - Time first assessed: 2000  - Present on Hospital Admission: Y  -MC Side: Right  -MC Orientation: anterior  -MC Location: knee  -MC Primary Wound Type: Abcess  -MC    Dressing Appearance  moist drainage  -AS      Closure  SVEN Kerlix in place   -AS      Care, Wound  -- wrapped with kerlix  -AS         Wound  05/06/20 0700 Right anterior knee    Wound - Properties Group Date first assessed: 05/06/20  -CW Time first assessed: 0700  -CW Present on Hospital Admission: Y  -CW Side: Right  -CW Orientation: anterior  -CW Location: knee  -CW      User Key  (r) = Recorded By, (t) = Taken By, (c) = Cosigned By    Initials Name Provider Type    AS Joshua Paris, RN Registered Nurse    Ilana Bess, RN Registered Nurse    Jessica Sawyer RN Registered Nurse    Otilia Costello RN Registered Nurse    Natalie Potter RN Registered Nurse          Procedures:              Results Review:     I reviewed the patient's new clinical results.      Lab Results (last 24 hours)     Procedure Component Value Units Date/Time    Wound Culture - Wound, Knee, Right [[id number]] Collected:  05/06/20 0950    Specimen:  Wound from Knee, Right Updated:  05/06/20 1056     Gram Stain Moderate (3+) WBCs per low power field      Occasional Gram positive cocci, intracellular    Wound Culture - Wound, Knee, Right [[id number]] Collected:  05/06/20 0948    Specimen:  Wound from Knee, Right Updated:  05/06/20 1045     Gram Stain Few (2+) WBCs per low power field      No organisms seen    Anaerobic Culture - Swab, Knee, Right [[id number]] Collected:  05/06/20 0950    Specimen:  Swab from Knee, Right Updated:  05/06/20 0950    Anaerobic Culture - Swab, Knee, Right [[id number]] Collected:  05/06/20 0948    Specimen:  Swab from Knee, Right Updated:  05/06/20 0948    Potassium [[id number]]  (Normal) Collected:  05/06/20 0552    Specimen:  Blood Updated:  05/06/20 0903     Potassium 4.6 mmol/L     Magnesium [[id number]]  (Normal) Collected:  05/06/20 0552    Specimen:  Blood Updated:  05/06/20 0755     Magnesium 2.1 mg/dL     Protime-INR [[id number]]  (Normal) Collected:  05/06/20 0552    Specimen:  Blood Updated:  05/06/20 0709     Protime 10.2 Seconds      INR 0.97    aPTT [[id number]]  (Normal) Collected:  05/06/20 0552    Specimen:  Blood Updated:   05/06/20 0709     PTT 25.4 seconds     Blood Culture - Blood, Arm, Right [[id number]] Collected:  05/02/20 1724    Specimen:  Blood from Arm, Right Updated:  05/05/20 1731     Blood Culture No growth at 3 days    Blood Culture - Blood, Arm, Left [[id number]] Collected:  05/02/20 1719    Specimen:  Blood from Arm, Left Updated:  05/05/20 1731     Blood Culture No growth at 3 days    Vancomycin, Trough [[id number]]  (Normal) Collected:  05/05/20 1257    Specimen:  Blood Updated:  05/05/20 1356     Vancomycin Trough 12.00 mcg/mL         No results found for: HGBA1C  Results from last 7 days   Lab Units 05/06/20  0552 05/02/20 1719   INR  0.97 1.08           No results found for: LIPASE  No results found for: CHOL, CHLPL, TRIG, HDL, LDL, LDLDIRECT    No results found for: INTRAOP, PREDX, FINALDX, COMDX    Microbiology Results (last 10 days)     Procedure Component Value - Date/Time    Wound Culture - Wound, Knee, Right [[id number]] Collected:  05/06/20 0950    Lab Status:  Preliminary result Specimen:  Wound from Knee, Right Updated:  05/06/20 1056     Gram Stain Moderate (3+) WBCs per low power field      Occasional Gram positive cocci, intracellular    Wound Culture - Wound, Knee, Right [[id number]] Collected:  05/06/20 0948    Lab Status:  Preliminary result Specimen:  Wound from Knee, Right Updated:  05/06/20 1045     Gram Stain Few (2+) WBCs per low power field      No organisms seen    Blood Culture - Blood, Arm, Right [[id number]] Collected:  05/02/20 1724    Lab Status:  Preliminary result Specimen:  Blood from Arm, Right Updated:  05/05/20 1731     Blood Culture No growth at 3 days    Wound Culture - Swab, Knee, Right [[id number]]  (Abnormal)  (Susceptibility) Collected:  05/02/20 1720    Lab Status:  Final result Specimen:  Swab from Knee, Right Updated:  05/04/20 1242     Wound Culture Light growth (2+) Staphylococcus aureus, MRSA     Comment:   Methicillin resistant Staphylococcus aureus, Patient may be an  isolation risk.        Gram Stain Occasional WBCs seen      No organisms seen    Susceptibility      Staphylococcus aureus, MRSA     ABEL     Clindamycin Susceptible     Erythromycin Resistant     Inducible Clindamycin Resistance Negative     Oxacillin Resistant     Penicillin G Resistant     Rifampin Susceptible     Tetracycline Susceptible     Trimethoprim + Sulfamethoxazole Susceptible     Vancomycin Susceptible                Susceptibility Comments     Staphylococcus aureus, MRSA    This isolate does not demonstrate inducible clindamycin resistance in vitro.               Blood Culture - Blood, Arm, Left [[id number]] Collected:  05/02/20 7523    Lab Status:  Preliminary result Specimen:  Blood from Arm, Left Updated:  05/05/20 1732     Blood Culture No growth at 3 days          ECG/EMG Results (most recent)     None                    Xr Knee 1 Or 2 View Right    Result Date: 5/3/2020  Diffuse soft tissue swelling involves the right lower extremity. It is nonspecific. Cellulitis would be in the differential diagnosis.  No subcutaneous emphysema is seen.  No retained radiopaque foreign body is identified.  No acute fracture or acute malalignment.  No definite radiographic evidence for osteomyelitis.  A small right knee joint effusion is suspected.  Electronically Signed By-Dr. Pascual Day MD On:5/3/2020 4:01 AM This report was finalized on [id number] by Dr. Pascual Day MD.    Mri Knee Right Without Contrast    Result Date: 5/5/2020  1.Evidence for a peripheral tear of the posterior horn of lateral meniscus. There is evidence for associated para meniscal cyst measuring 8 mm. 2.Evidence for large horizontal type meniscal tear involving the posterior horn and body segment of the medial meniscus. There is a displaced meniscal fragment which extends into the inferior margin of the medial gutter. 3.Extensive abnormal edema throughout the soft tissues overlying the knee most pronounced at the anterior lateral  aspect. A complex fluid collection is noted. Given the clinical presentation, the findings suggest changes of soft tissue cellulitis with abscess formation. 4.There is also evidence for potential spread of infection across fascial planes with involvement of the underlying muscular soft tissues of the superior right calf or underlying pyomyositis. 5.No evidence for osteomyelitis. Electronically Signed: Izaiah Foster MD 5/5/2020 15:54 EDT          Xrays, labs reviewed personally by physician.    Medication Review:   I have reviewed the patient's current medication list      Scheduled Meds    [START ON 5/8/2020] !Vancomycin Level Draw Needed  Does not apply Once   [START ON 5/7/2020] aspirin 325 mg Oral Daily   nicotine 1 patch Transdermal Nightly   polyethylene glycol 17 g Oral Daily   vancomycin 1,250 mg Intravenous Q8H       Meds Infusions    Pharmacy to dose vancomycin     sodium chloride 125 mL/hr Last Rate: 125 mL/hr (05/06/20 0926)       Meds PRN  acetaminophen  •  HYDROcodone-acetaminophen  •  melatonin  •  Morphine **AND** naloxone  •  ondansetron **OR** ondansetron  •  oxyCODONE  •  Pharmacy to dose vancomycin  •  sodium chloride    I personally reviewed patient's x-ray films   I personally reviewed patient's EKG strips     Assessment/Plan   Assessment/Plan     Active Hospital Problems    Diagnosis  POA   • **Cellulitis of right lower extremity [L03.115]  Yes   • COPD (chronic obstructive pulmonary disease) (CMS/[facility]) [J44.9]  Yes   • Abscess of bursa, right knee [M71.061]  Unknown   • Tobacco dependence syndrome [F17.200]  Yes      Resolved Hospital Problems   No resolved problems to display.       MEDICAL DECISION MAKING COMPLEXITY BY PROBLEM:     Right anterior knee cellulitis/ Prepatellar bursitis status post incision and drainage  MRI knee finding noted.  Continue on IV vancomycin pharmacy to dose ... Wound culture positive for MRSA   Monitor redness and swelling involving the right knee  area.  Orthopaedic help appreciated  Disposition once cleared by Ortho service   Pt is going to require in total of 2 week or 4 week antibiotics therapy.        Tobacco abuse  Encourage cessation          VTE Prophylaxis - SCDs      Mechanical Order History:      Ordered        05/02/20 2041  Place Sequential Compression Device  Once         05/02/20 2041  Maintain Sequential Compression Device  Continuous                 Pharmalogical Order History:     None            Code Status -   Code Status and Medical Interventions:   Ordered at: 05/06/20 0916     Level Of Support Discussed With:    Patient     Code Status:    CPR     Medical Interventions (Level of Support Prior to Arrest):    Full           Discharge Planning          Destination      Coordination has not been started for this encounter.      Durable Medical Equipment      Coordination has not been started for this encounter.      Dialysis/Infusion      Coordination has not been started for this encounter.      Home Medical Care      Coordination has not been started for this encounter.      Therapy      Coordination has not been started for this encounter.      Community Resources      Coordination has not been started for this encounter.            Electronically signed by Liat Staples MD, 05/06/20, 13:26.  [facility] Hospitalist Team

## 2020-05-06 NOTE — PROGRESS NOTES
Discharge Planning Assessment   Jan     Patient Name: Acosta Howard  MRN: [id number]  Today's Date: 5/6/2020    Admit Date: 5/2/2020          Plan    Plan Comments  -- barriers to discharge is I and D of knee abscess       Carol naegele rn  Case management  Office number [phone]  Cell phone [phone]

## 2020-05-06 NOTE — OP NOTE
PATELLA BURSA EXCISION  Procedure Report    Patient Name:  Acosta Howard  YOB: 1975    Date of Surgery:  5/6/2020         Pre-op Diagnosis: Left prepatellar bursitis    Postop diagnosis: Same    Indication: 44-year-old white male with draining right prepatellar bursa.  Has history of MRSA infections.  Felt that should be drained due to lack of response to p.o. antibiotics      Procedure/CPT® Codes:      Procedure(s):  PATELLA BURSA EXCISION, right    Staff:  Surgeon(s):  Negro Longoria MD         Anesthesia: General    Estimated Blood Loss: 30 cc    Implants:    Nothing was implanted during the procedure    Specimen:          Culture        Findings: Purulent fluid    Complications: 0    Description of Procedure: Patient seen in the holding room.  Identified the right knee is correct knee.  This initialed by me.  He was already on vancomycin and received an additional dose at the beginning of the case.  Patient had general anesthesia had timeout performed was positioned supine with a bump under his right hip.  Had sterile prep and drape of the right lower extremity.  A 2 cm longitudinal incision made just off midline to the lateral over the bursa longitudinally.  Purulent fluid was encountered.  This was cultured.  A rongeur was then used to debride the bursa.  The wound was then thoroughly irrigated with saline with a bulb syringe.  The wound was then packed with half-inch plain Nu Gauze and sterile dressings applied  Plan is for daily wound packing.  We will continue vancomycin until cultures known.      Negro Longoria MD     Date: 5/6/2020  Time: 07:45

## 2020-05-07 LAB
BACTERIA SPEC AEROBE CULT: NORMAL
BACTERIA SPEC AEROBE CULT: NORMAL

## 2020-05-07 PROCEDURE — 25010000002 MORPHINE PER 10 MG: Performed by: ORTHOPAEDIC SURGERY

## 2020-05-07 PROCEDURE — 99232 SBSQ HOSP IP/OBS MODERATE 35: CPT | Performed by: HOSPITALIST

## 2020-05-07 PROCEDURE — 25010000002 VANCOMYCIN: Performed by: INTERNAL MEDICINE

## 2020-05-07 PROCEDURE — 25010000002 VANCOMYCIN 10 G RECONSTITUTED SOLUTION: Performed by: INTERNAL MEDICINE

## 2020-05-07 RX ADMIN — MORPHINE SULFATE 4 MG: 4 INJECTION INTRAVENOUS at 11:00

## 2020-05-07 RX ADMIN — SODIUM CHLORIDE 1250 MG: 9 INJECTION, SOLUTION INTRAVENOUS at 15:59

## 2020-05-07 RX ADMIN — POLYETHYLENE GLYCOL 3350 17 G: 17 POWDER, FOR SOLUTION ORAL at 08:23

## 2020-05-07 RX ADMIN — OXYCODONE HYDROCHLORIDE 10 MG: 5 TABLET ORAL at 10:42

## 2020-05-07 RX ADMIN — ASPIRIN 325 MG: 325 TABLET ORAL at 08:22

## 2020-05-07 RX ADMIN — SODIUM CHLORIDE 1250 MG: 9 INJECTION, SOLUTION INTRAVENOUS at 06:02

## 2020-05-07 RX ADMIN — SODIUM CHLORIDE 1250 MG: 9 INJECTION, SOLUTION INTRAVENOUS at 23:16

## 2020-05-07 RX ADMIN — OXYCODONE HYDROCHLORIDE 10 MG: 5 TABLET ORAL at 03:41

## 2020-05-07 NOTE — PLAN OF CARE
Problem: Patient Care Overview  Goal: Plan of Care Review  Outcome: Ongoing (interventions implemented as appropriate)  Note:   Pt had I&D on [facility] on 5/6/20. Pt is resting well and pain is manageable with meds. Will continue to monitor.

## 2020-05-07 NOTE — PROGRESS NOTES
Case Management/Social Work    Patient Name:  Acosta Howard  YOB: 1975  MRN: [id number]  Admit Date:  5/2/2020        SW spoke with pt by phone who reported wanting services through [facility]. A tc was made to [facility] and an appointment was set for 5/18/2020 at 11:00am. Pt reported utilizing [facility] for rx and is aware of good rx.      Giacomo Morgan [facility]   Cell: [phone]  Office: [phone]  Fax: [phone]

## 2020-05-07 NOTE — PROGRESS NOTES
[facility] Medicine Services Daily Progress Note      Hospitalist Team  LOS 3 days      Patient Care Team:  Provider, No Known as PCP - General    Patient Location: 378/1      Subjective   Subjective   Denies for any nausea or vomiting. No chest pain.  Leg covered with dressing, denies for any new complaint.      Chief Complaint / Subjective  Chief Complaint   Patient presents with   • Knee Pain         Brief Synopsis of Hospital Course/HPI    44-year-old man with history of tobacco dependence.  Presented to the emergency room with complaints of right knee pain that has been going on for the past several days.  Stated initially noticing a boil on the anterior aspect of his right knee then subsequently the knee became swollen, red and painful and this has progressively worsened.  Rates the pain of moderate intensity worsened with movements.  Denies any associated nausea, vomiting or chills nor fever.    Patient stated  initially going to [facility] 4 days ago and was given Rocephin shot, anti-inflammatory agent and discharged on Bactrim.    Symptoms continued and he presented at an urgent care facility on 5/2/2020 and was advised to come to the emergency room for further evaluation.          Date::    5/3  Patient seen and examined  Stated right knee pain somewhat improving  On IV antibiotics        Review of Systems   Constitution: Negative for chills and fever.   HENT: Negative for congestion.    Eyes: Negative for blurred vision.   Cardiovascular: Negative for chest pain.   Respiratory: Negative for cough and shortness of breath.    Endocrine: Negative for cold intolerance and heat intolerance.   Gastrointestinal: Negative for abdominal pain, nausea and vomiting.   Genitourinary: Negative for dysuria.   Neurological: Negative for focal weakness.   Psychiatric/Behavioral: Negative for altered mental status.   All other systems reviewed and are negative.        Objective   Objective       "    Vital Signs  Temp:  [97.6 °F (36.4 °C)-98 °F (36.7 °C)] 97.6 °F (36.4 °C)  Heart Rate:  [69-98] 76  Resp:  [17-21] 20  BP: (128-150)/(74-87) 150/87  Oxygen Therapy  SpO2: 99 %  Pulse Oximetry Type: Intermittent  Device (Oxygen Therapy): room air  Device (Oxygen Therapy): room air  Flow (L/min): 4  Flowsheet Rows      First Filed Value   Admission Height  182.9 cm (72\") Documented at 05/02/2020 1648   Admission Weight  83.8 kg (184 lb 11.9 oz) Documented at 05/02/2020 1648        Intake & Output (last 3 days)       05/04 0701 - 05/05 0700 05/05 0701 - 05/06 0700 05/06 0701 - 05/07 0700 05/07 0701 - 05/08 0700    P.O. 1340 420 900 240    I.V. (mL/kg)   100 (1.2)     IV Piggyback 250  250     Total Intake(mL/kg) 1590 (19.3) 420 (5.1) 1250 (15.2) 240 (2.9)    Urine (mL/kg/hr)   2000 (1)     Total Output   2000     Net +1590 +420 -750 +240                Lines, Drains & Airways    Active LDAs     Name:   Placement date:   Placement time:   Site:   Days:    Peripheral IV 05/02/20 1721 Left Antecubital   05/02/20    1721    Antecubital   less than 1                  Physical Exam:    Physical Exam   Constitutional: He is oriented to person, place, and time. He appears well-developed. No distress.   HENT:   Head: Normocephalic and atraumatic.   Eyes: Pupils are equal, round, and reactive to light. EOM are normal.   Neck: Neck supple.   Cardiovascular: Normal rate, regular rhythm and normal heart sounds.   Pulmonary/Chest: Effort normal and breath sounds normal. No respiratory distress.   Abdominal: Soft. Bowel sounds are normal. There is no tenderness.   Musculoskeletal: He exhibits no edema.   Neurological: He is alert and oriented to person, place, and time. No cranial nerve deficit.   Skin:   Anterior aspect of the right knee- crusted skin lesion with surrounding erythema, warmth to touch and also tender to touch       Vitals reviewed.           Wounds (last 24 hours)      LDA Wound     Row Name 05/07/20 0605 " 05/06/20 1945          Wound 05/02/20 2000 Right anterior knee Abcess    Wound - Properties Group Date first assessed: 05/02/20  -MC Time first assessed: 2000  -MC Present on Hospital Admission: Y  -MC Side: Right  -MC Orientation: anterior  -MC Location: knee  -MC Primary Wound Type: Abcess  -MC    Dressing Appearance  dried drainage  -AG  dry;intact  -AG     Closure  -- marzena dressing  -AG  SVEN  -AG     Base  --  dressing in place, unable to visualize  -AG     Care, Wound  cleansed with;sterile normal saline  -AG  --     Dressing Care, Wound  dressing changed;gauze  -AG  --        Wound 05/06/20 0700 Right anterior knee    Wound - Properties Group Date first assessed: 05/06/20  -CW Time first assessed: 0700  -CW Present on Hospital Admission: Y  -CW Side: Right  -CW Orientation: anterior  -CW Location: knee  -CW    Dressing Appearance  --  dry;intact  -AG       User Key  (r) = Recorded By, (t) = Taken By, (c) = Cosigned By    Initials Name Provider Type    Jessica Sawyer, RN Registered Nurse    Otilia Costello, RN Registered Nurse     Pooja Reid RN Registered Nurse          Procedures:              Results Review:     I reviewed the patient's new clinical results.      Lab Results (last 24 hours)     Procedure Component Value Units Date/Time    Wound Culture - Wound, Knee, Right [[id number]]  (Abnormal) Collected:  05/06/20 0950    Specimen:  Wound from Knee, Right Updated:  05/07/20 0933     Wound Culture Scant growth (1+) Staphylococcus aureus, MRSA     Comment:   Methicillin resistant Staphylococcus aureus, Patient may be an isolation risk.        Gram Stain Moderate (3+) WBCs per low power field      Occasional Gram positive cocci, intracellular    Wound Culture - Wound, Knee, Right [[id number]]  (Abnormal) Collected:  05/06/20 0948    Specimen:  Wound from Knee, Right Updated:  05/07/20 0932     Wound Culture Scant growth (1+) Staphylococcus aureus, MRSA     Comment:   Methicillin resistant  Staphylococcus aureus, Patient may be an isolation risk.        Gram Stain Few (2+) WBCs per low power field      No organisms seen    Blood Culture - Blood, Arm, Right [[id number]] Collected:  05/02/20 1724    Specimen:  Blood from Arm, Right Updated:  05/06/20 1730     Blood Culture No growth at 4 days    Blood Culture - Blood, Arm, Left [[id number]] Collected:  05/02/20 1719    Specimen:  Blood from Arm, Left Updated:  05/06/20 1730     Blood Culture No growth at 4 days        No results found for: HGBA1C  Results from last 7 days   Lab Units 05/06/20  0552 05/02/20 1719   INR  0.97 1.08           No results found for: LIPASE  No results found for: CHOL, CHLPL, TRIG, HDL, LDL, LDLDIRECT    No results found for: INTRAOP, PREDX, FINALDX, COMDX    Microbiology Results (last 10 days)     Procedure Component Value - Date/Time    Wound Culture - Wound, Knee, Right [[id number]]  (Abnormal) Collected:  05/06/20 0950    Lab Status:  Preliminary result Specimen:  Wound from Knee, Right Updated:  05/07/20 0933     Wound Culture Scant growth (1+) Staphylococcus aureus, MRSA     Comment:   Methicillin resistant Staphylococcus aureus, Patient may be an isolation risk.        Gram Stain Moderate (3+) WBCs per low power field      Occasional Gram positive cocci, intracellular    Wound Culture - Wound, Knee, Right [[id number]]  (Abnormal) Collected:  05/06/20 0948    Lab Status:  Preliminary result Specimen:  Wound from Knee, Right Updated:  05/07/20 0932     Wound Culture Scant growth (1+) Staphylococcus aureus, MRSA     Comment:   Methicillin resistant Staphylococcus aureus, Patient may be an isolation risk.        Gram Stain Few (2+) WBCs per low power field      No organisms seen    Blood Culture - Blood, Arm, Right [[id number]] Collected:  05/02/20 1724    Lab Status:  Preliminary result Specimen:  Blood from Arm, Right Updated:  05/06/20 1730     Blood Culture No growth at 4 days    Wound Culture - Swab, Knee, Right [[id number]]   (Abnormal)  (Susceptibility) Collected:  05/02/20 1720    Lab Status:  Final result Specimen:  Swab from Knee, Right Updated:  05/04/20 1242     Wound Culture Light growth (2+) Staphylococcus aureus, MRSA     Comment:   Methicillin resistant Staphylococcus aureus, Patient may be an isolation risk.        Gram Stain Occasional WBCs seen      No organisms seen    Susceptibility      Staphylococcus aureus, MRSA     ABEL     Clindamycin Susceptible     Erythromycin Resistant     Inducible Clindamycin Resistance Negative     Oxacillin Resistant     Penicillin G Resistant     Rifampin Susceptible     Tetracycline Susceptible     Trimethoprim + Sulfamethoxazole Susceptible     Vancomycin Susceptible                Susceptibility Comments     Staphylococcus aureus, MRSA    This isolate does not demonstrate inducible clindamycin resistance in vitro.               Blood Culture - Blood, Arm, Left [[id number]] Collected:  05/02/20 1719    Lab Status:  Preliminary result Specimen:  Blood from Arm, Left Updated:  05/06/20 1730     Blood Culture No growth at 4 days          ECG/EMG Results (most recent)     None                    Xr Knee 1 Or 2 View Right    Result Date: 5/3/2020  Diffuse soft tissue swelling involves the right lower extremity. It is nonspecific. Cellulitis would be in the differential diagnosis.  No subcutaneous emphysema is seen.  No retained radiopaque foreign body is identified.  No acute fracture or acute malalignment.  No definite radiographic evidence for osteomyelitis.  A small right knee joint effusion is suspected.  Electronically Signed By-Dr. Pascual Day MD On:5/3/2020 4:01 AM This report was finalized on [id number] by Dr. Pascual Day MD.    Mri Knee Right Without Contrast    Result Date: 5/5/2020  1.Evidence for a peripheral tear of the posterior horn of lateral meniscus. There is evidence for associated para meniscal cyst measuring 8 mm. 2.Evidence for large horizontal type meniscal tear  involving the posterior horn and body segment of the medial meniscus. There is a displaced meniscal fragment which extends into the inferior margin of the medial gutter. 3.Extensive abnormal edema throughout the soft tissues overlying the knee most pronounced at the anterior lateral aspect. A complex fluid collection is noted. Given the clinical presentation, the findings suggest changes of soft tissue cellulitis with abscess formation. 4.There is also evidence for potential spread of infection across fascial planes with involvement of the underlying muscular soft tissues of the superior right calf or underlying pyomyositis. 5.No evidence for osteomyelitis. Electronically Signed: Izaiah Foster MD 5/5/2020 15:54 EDT          Xrays, labs reviewed personally by physician.    Medication Review:   I have reviewed the patient's current medication list      Scheduled Meds    [START ON 5/8/2020] !Vancomycin Level Draw Needed  Does not apply Once   aspirin 325 mg Oral Daily   nicotine 1 patch Transdermal Nightly   polyethylene glycol 17 g Oral Daily   vancomycin 1,250 mg Intravenous Q8H       Meds Infusions    Pharmacy to dose vancomycin        Meds PRN  acetaminophen  •  HYDROcodone-acetaminophen  •  melatonin  •  Morphine **AND** naloxone  •  ondansetron **OR** ondansetron  •  oxyCODONE  •  Pharmacy to dose vancomycin  •  sodium chloride    I personally reviewed patient's x-ray films   I personally reviewed patient's EKG strips     Assessment/Plan   Assessment/Plan     Active Hospital Problems    Diagnosis  POA   • **Cellulitis of right lower extremity [L03.115]  Yes   • COPD (chronic obstructive pulmonary disease) (CMS/[facility]) [J44.9]  Yes   • Abscess of bursa, right knee [M71.061]  Unknown   • Tobacco dependence syndrome [F17.200]  Yes      Resolved Hospital Problems   No resolved problems to display.       MEDICAL DECISION MAKING COMPLEXITY BY PROBLEM:     Right anterior knee cellulitis/ Prepatellar bursitis status post  incision and drainage  MRI knee finding noted.  Continue on IV vancomycin pharmacy to dose ... Wound culture positive for MRSA   Monitor redness and swelling involving the right knee area.  Orthopaedic help appreciated  Disposition once cleared by Ortho service ... Spoke to Dr. Longoria ... Advised discharge for am.   Pt is going to require in total of 2 week or 4 week antibiotics therapy on discharge.  Follow surgical wound culture.        Tobacco abuse  Encourage cessation          VTE Prophylaxis - SCDs      Mechanical Order History:      Ordered        05/02/20 2041  Place Sequential Compression Device  Once         05/02/20 2041  Maintain Sequential Compression Device  Continuous                 Pharmalogical Order History:     None            Code Status -   Code Status and Medical Interventions:   Ordered at: 05/06/20 0916     Level Of Support Discussed With:    Patient     Code Status:    CPR     Medical Interventions (Level of Support Prior to Arrest):    Full           Discharge Planning          Destination      Coordination has not been started for this encounter.      Durable Medical Equipment      Coordination has not been started for this encounter.      Dialysis/Infusion      Coordination has not been started for this encounter.      Home Medical Care      Coordination has not been started for this encounter.      Therapy      Coordination has not been started for this encounter.      Community Resources      Coordination has not been started for this encounter.            Electronically signed by Liat Staples MD, 05/07/20, 10:42.  [facility] Hospitalist Team

## 2020-05-07 NOTE — PLAN OF CARE
Patient has rested well today.  He required morphine on top of the Roxicodone today when the dressing/packing was changed.  He now states that his pain is back to baseline.  He has been ambulating to the bathroom with the walker.  I encouraged ambulation in the [place] at some point. Will continue to monitor.

## 2020-05-07 NOTE — PROGRESS NOTES
Case Management/Social Work    Patient Name:  Acosta Howard  YOB: 1975  MRN: [id number]  Admit Date:  5/2/2020        SW called nurses station to see if RN can make contact with pt do to SW calling pt room and the line being busy. DAVIE was informed that the pt is sleeping at this time.        Electronically signed by:  Mary Morgan  05/07/20 12:01

## 2020-05-07 NOTE — PROGRESS NOTES
LOS: 3 days   Patient Care Team:  Provider, No Known as PCP - General        Subjective     Status post right prepatellar bursa incision and drainage May 6    Pain when flexing knee  Dressing was changed today but the packing was not removed by the nurse      Objective     Vital Signs  Vitals:    05/06/20 0822 05/06/20 1520 05/06/20 1953 05/07/20 0337   BP: 125/69 128/77 130/74 150/87   BP Location: Left arm Right arm     Patient Position: Lying Lying     Pulse: 74 98 69 76   Resp: 10 17 21 20   Temp: 98.6 °F (37 °C) 97.8 °F (36.6 °C) 98 °F (36.7 °C) 97.6 °F (36.4 °C)   TempSrc: Tympanic Oral Oral Oral   SpO2: 94% 96% 97% 99%   Weight:       Height:           Physical Exam:   Dressing clean dry intact   Alert and orient x3   Cultures scant growth  results Review:     I reviewed the patient's new clinical results.  I reviewed the patient's new imaging results    Lab Results (last 24 hours)     Procedure Component Value Units Date/Time    Wound Culture - Wound, Knee, Right [[id number]]  (Abnormal) Collected:  05/06/20 0950    Specimen:  Wound from Knee, Right Updated:  05/07/20 0850     Wound Culture Scant growth (1+) Staphylococcus aureus, MRSA     Comment:   Methicillin resistant Staphylococcus aureus, Patient may be an isolation risk.        Gram Stain Moderate (3+) WBCs per low power field      Occasional Gram positive cocci, intracellular    Wound Culture - Wound, Knee, Right [[id number]]  (Abnormal) Collected:  05/06/20 0948    Specimen:  Wound from Knee, Right Updated:  05/07/20 0849     Wound Culture Scant growth (1+) Staphylococcus aureus, MRSA     Comment:   Methicillin resistant Staphylococcus aureus, Patient may be an isolation risk.        Gram Stain Few (2+) WBCs per low power field      No organisms seen    Blood Culture - Blood, Arm, Right [[id number]] Collected:  05/02/20 1724    Specimen:  Blood from Arm, Right Updated:  05/06/20 1730     Blood Culture No growth at 4 days    Blood Culture - Blood,  Arm, Left [[id number]] Collected:  05/02/20 1719    Specimen:  Blood from Arm, Left Updated:  05/06/20 1730     Blood Culture No growth at 4 days    Anaerobic Culture - Swab, Knee, Right [[id number]] Collected:  05/06/20 0950    Specimen:  Swab from Knee, Right Updated:  05/06/20 0950    Anaerobic Culture - Swab, Knee, Right [[id number]] Collected:  05/06/20 0948    Specimen:  Swab from Knee, Right Updated:  05/06/20 0948        Imaging Results (Last 24 Hours)     ** No results found for the last 24 hours. **            Medication Review:   Scheduled Meds:  [START ON 5/8/2020] !Vancomycin Level Draw Needed  Does not apply Once   aspirin 325 mg Oral Daily   nicotine 1 patch Transdermal Nightly   polyethylene glycol 17 g Oral Daily   vancomycin 1,250 mg Intravenous Q8H     Continuous Infusions:  Pharmacy to dose vancomycin      PRN Meds:.acetaminophen  •  HYDROcodone-acetaminophen  •  melatonin  •  Morphine **AND** naloxone  •  ondansetron **OR** ondansetron  •  oxyCODONE  •  Pharmacy to dose vancomycin  •  sodium chloride      Assessment/Plan     Instructed nurse to change packing daily   home tomorrow on doxycycline if MRSA    [place]    Negro Longoria MD  05/07/20  09:20

## 2020-05-07 NOTE — PROGRESS NOTES
Discharge Planning Assessment   Jan     Patient Name: Acosta Howard  MRN: [id number]  Today's Date: 5/7/2020    Admit Date: 5/2/2020          Plan    Plan Comments  per md notes patient will go home with po antibiotics; patient most likely will need daily dressing changes and primary nurse states that she will teach patient to do at home due to no home health services availabl due to no primary md or health insurance       Carol naegele rn  Case management  Office number [phone]  Cell phone [phone]

## 2020-05-08 VITALS
OXYGEN SATURATION: 96 % | HEIGHT: 72 IN | BODY MASS INDEX: 24.63 KG/M2 | SYSTOLIC BLOOD PRESSURE: 119 MMHG | WEIGHT: 181.88 LBS | TEMPERATURE: 97.8 F | RESPIRATION RATE: 20 BRPM | DIASTOLIC BLOOD PRESSURE: 74 MMHG | HEART RATE: 73 BPM

## 2020-05-08 LAB
ANION GAP SERPL CALCULATED.3IONS-SCNC: 13 MMOL/L (ref 5–15)
BACTERIA SPEC AEROBE CULT: ABNORMAL
BACTERIA SPEC AEROBE CULT: ABNORMAL
BUN BLD-MCNC: 14 MG/DL (ref 6–20)
BUN/CREAT SERPL: 18.7 (ref 7–25)
CALCIUM SPEC-SCNC: 9.6 MG/DL (ref 8.6–10.5)
CHLORIDE SERPL-SCNC: 101 MMOL/L (ref 98–107)
CO2 SERPL-SCNC: 26 MMOL/L (ref 22–29)
CREAT BLD-MCNC: 0.75 MG/DL (ref 0.76–1.27)
GFR SERPL CREATININE-BSD FRML MDRD: 112 ML/MIN/1.73
GLUCOSE BLD-MCNC: 91 MG/DL (ref 65–99)
GRAM STN SPEC: ABNORMAL
POTASSIUM BLD-SCNC: 4.4 MMOL/L (ref 3.5–5.2)
SODIUM BLD-SCNC: 140 MMOL/L (ref 136–145)
VANCOMYCIN TROUGH SERPL-MCNC: 16.2 MCG/ML (ref 5–20)

## 2020-05-08 PROCEDURE — 99239 HOSP IP/OBS DSCHRG MGMT >30: CPT | Performed by: HOSPITALIST

## 2020-05-08 PROCEDURE — 80048 BASIC METABOLIC PNL TOTAL CA: CPT | Performed by: HOSPITALIST

## 2020-05-08 PROCEDURE — 25010000002 MORPHINE PER 10 MG: Performed by: ORTHOPAEDIC SURGERY

## 2020-05-08 PROCEDURE — 80202 ASSAY OF VANCOMYCIN: CPT | Performed by: INTERNAL MEDICINE

## 2020-05-08 RX ORDER — HYDROCODONE BITARTRATE AND ACETAMINOPHEN 5; 325 MG/1; MG/1
1 TABLET ORAL EVERY 6 HOURS PRN
Qty: 30 TABLET | Refills: 0 | Status: SHIPPED | OUTPATIENT
Start: 2020-05-08 | End: 2020-05-15

## 2020-05-08 RX ORDER — DOXYCYCLINE 100 MG/1
100 TABLET ORAL EVERY 12 HOURS SCHEDULED
Qty: 27 TABLET | Refills: 0 | Status: SHIPPED | OUTPATIENT
Start: 2020-05-08 | End: 2020-05-22

## 2020-05-08 RX ORDER — DOXYCYCLINE 100 MG/1
100 TABLET ORAL EVERY 12 HOURS SCHEDULED
Status: DISCONTINUED | OUTPATIENT
Start: 2020-05-08 | End: 2020-05-08 | Stop reason: HOSPADM

## 2020-05-08 RX ADMIN — ASPIRIN 325 MG: 325 TABLET ORAL at 09:31

## 2020-05-08 RX ADMIN — DOXYCYCLINE 100 MG: 100 TABLET, FILM COATED ORAL at 09:32

## 2020-05-08 RX ADMIN — Medication 10 ML: at 09:31

## 2020-05-08 RX ADMIN — POLYETHYLENE GLYCOL 3350 17 G: 17 POWDER, FOR SOLUTION ORAL at 09:31

## 2020-05-08 RX ADMIN — OXYCODONE HYDROCHLORIDE 10 MG: 5 TABLET ORAL at 09:31

## 2020-05-08 RX ADMIN — MORPHINE SULFATE 4 MG: 4 INJECTION INTRAVENOUS at 09:31

## 2020-05-08 NOTE — DISCHARGE SUMMARY
[facility] Medicine Services  DISCHARGE SUMMARY        Prepared For PCP:  Provider, No Known    Patient Name: cAosta Howard  : 1975  MRN: [id number]      Date of Admission:   2020    Date of Discharge:  2020    Length of stay:  LOS: 4 days     Hospital Course     Presenting Problem:   Cellulitis of right lower extremity [L03.115]  Right knee pain, unspecified chronicity [M25.561]  Cellulitis of right lower extremity [L03.115]      Active Hospital Problems    Diagnosis  POA   • **Cellulitis of right lower extremity [L03.115]  Yes   • COPD (chronic obstructive pulmonary disease) (CMS/[facility]) [J44.9]  Yes   • Abscess of bursa, right knee [M71.061]  Unknown   • Tobacco dependence syndrome [F17.200]  Yes      Resolved Hospital Problems   No resolved problems to display.           Hospital Course by problem list .    Acosta Howard is a 44 y.o. male admitted with redness and cellulitis involving the right lower leg, went MRI of the knee revealing abscess involving the prepatellar bursa, patient's wound culture grew MRSA, patient was treated with IV vancomycin.  Patient seen in consultation by orthopedic service, patient underwent incision and drainage of the abscess, orthopedic service Dr. Longoria advised doxycycline 100 mg p.o. 2 times a day for 2 weeks.    Condition on discharge stable.    Spent greater than 30 minutes in arranging for the discharge.          Recommendation for Outpatient Providers:     Follow-up with the family physician in a week time  Follow-up with the orthopedic service in 2 weeks time        Reasons For Change In Medications and Indications for New Medications:        Day of Discharge     HPI:       Vital Signs:   Temp:  [97.4 °F (36.3 °C)-97.8 °F (36.6 °C)] 97.8 °F (36.6 °C)  Heart Rate:  [71-80] 73  Resp:  [16-20] 20  BP: (119-153)/(74-96) 119/74     Physical Exam:  Physical Exam   Constitutional: He is oriented to person, place, and time. He appears  well-developed and well-nourished. No distress.   HENT:   Head: Normocephalic and atraumatic.   Right Ear: External ear normal.   Left Ear: External ear normal.   Nose: Nose normal.   Mouth/Throat: Oropharynx is clear and moist. No oropharyngeal exudate.   Eyes: Pupils are equal, round, and reactive to light. Conjunctivae and EOM are normal. Right eye exhibits no discharge. Left eye exhibits no discharge. No scleral icterus.   Neck: Normal range of motion. No JVD present. No tracheal deviation present. No thyromegaly present.   Cardiovascular: Normal rate, regular rhythm, normal heart sounds and intact distal pulses. Exam reveals no gallop and no friction rub.   No murmur heard.  Pulmonary/Chest: Effort normal and breath sounds normal. No stridor. No respiratory distress. He has no wheezes. He has no rales. He exhibits no tenderness.   Abdominal: Soft. Bowel sounds are normal. He exhibits no distension and no mass. There is no tenderness. There is no rebound and no guarding. No hernia.   Musculoskeletal: Normal range of motion. He exhibits no edema, tenderness or deformity.   Lymphadenopathy:     He has no cervical adenopathy.   Neurological: He is alert and oriented to person, place, and time. No cranial nerve deficit or sensory deficit. He exhibits normal muscle tone. Coordination normal.   Skin: Skin is warm and dry. No rash noted. He is not diaphoretic. No erythema.   Psychiatric: He has a normal mood and affect. His behavior is normal.   Nursing note and vitals reviewed.      Pertinent  and/or Most Recent Results     Results from last 7 days   Lab Units 05/08/20  0656 05/06/20  0552 05/05/20  0933 05/04/20  0451 05/03/20  0607 05/03/20  0606 05/02/20  1719   WBC 10*3/mm3  --   --  14.40* 15.30* 14.30*  --  15.90*   HEMOGLOBIN g/dL  --   --  12.9* 12.3* 12.7*  --  14.5   HEMATOCRIT %  --   --  37.9 36.1* 36.8*  --  42.2   PLATELETS 10*3/mm3  --   --  439 417 396  --  418   SODIUM mmol/L 140  --  137 139  --  139  137   POTASSIUM mmol/L 4.4 4.6 4.5 4.5  --  4.2 4.0   CHLORIDE mmol/L 101  --  102 102  --  103 101   CO2 mmol/L 26.0  --  23.0 26.0  --  26.0 23.0   BUN mg/dL 14  --  12 14  --  13 10   CREATININE mg/dL 0.75*  --  0.87 1.12  --  1.03 1.06   GLUCOSE mg/dL 91  --  136* 100*  --  118* 137*   CALCIUM mg/dL 9.6  --  9.3 9.1  --  8.8 9.4     Results from last 7 days   Lab Units 05/06/20  0552 05/02/20  2215 05/02/20  1719   BILIRUBIN mg/dL  --   --  0.3   ALK PHOS U/L  --   --  73   ALT (SGPT) U/L  --   --  15   AST (SGOT) U/L  --   --  15   PROTIME Seconds 10.2  --  11.1   INR  0.97  --  1.08   APTT seconds 25.4 25.1  --            Invalid input(s): TG, LDLCALC, LDLREALC        Brief Urine Lab Results     None          Microbiology Results Abnormal     Procedure Component Value - Date/Time    Wound Culture - Wound, Knee, Right [[id number]]  (Abnormal) Collected:  05/06/20 0950    Lab Status:  Final result Specimen:  Wound from Knee, Right Updated:  05/08/20 0702     Wound Culture Scant growth (1+) Staphylococcus aureus, MRSA     Comment:   Methicillin resistant Staphylococcus aureus, Patient may be an isolation risk.        Gram Stain Moderate (3+) WBCs per low power field      Occasional Gram positive cocci, intracellular    Narrative:       Refer to previous wound culture collected on 5/6/2020 0948 for MICs.    Wound Culture - Wound, Knee, Right [[id number]]  (Abnormal)  (Susceptibility) Collected:  05/06/20 0948    Lab Status:  Final result Specimen:  Wound from Knee, Right Updated:  05/08/20 0702     Wound Culture Scant growth (1+) Staphylococcus aureus, MRSA     Comment:   Methicillin resistant Staphylococcus aureus, Patient may be an isolation risk.        Gram Stain Few (2+) WBCs per low power field      No organisms seen    Susceptibility      Staphylococcus aureus, MRSA     ABEL     Clindamycin Susceptible     Erythromycin Resistant     Inducible Clindamycin Resistance Negative     Oxacillin Resistant      Penicillin G Resistant     Rifampin Susceptible     Tetracycline Susceptible     Trimethoprim + Sulfamethoxazole Susceptible     Vancomycin Susceptible                Susceptibility Comments     Staphylococcus aureus, MRSA    This isolate does not demonstrate inducible clindamycin resistance in vitro.               Blood Culture - Blood, Arm, Right [[id number]] Collected:  05/02/20 1724    Lab Status:  Final result Specimen:  Blood from Arm, Right Updated:  05/07/20 1730     Blood Culture No growth at 5 days    Blood Culture - Blood, Arm, Left [[id number]] Collected:  05/02/20 1719    Lab Status:  Final result Specimen:  Blood from Arm, Left Updated:  05/07/20 1730     Blood Culture No growth at 5 days    Wound Culture - Swab, Knee, Right [[id number]]  (Abnormal)  (Susceptibility) Collected:  05/02/20 1720    Lab Status:  Final result Specimen:  Swab from Knee, Right Updated:  05/04/20 1242     Wound Culture Light growth (2+) Staphylococcus aureus, MRSA     Comment:   Methicillin resistant Staphylococcus aureus, Patient may be an isolation risk.        Gram Stain Occasional WBCs seen      No organisms seen    Susceptibility      Staphylococcus aureus, MRSA     ABEL     Clindamycin Susceptible     Erythromycin Resistant     Inducible Clindamycin Resistance Negative     Oxacillin Resistant     Penicillin G Resistant     Rifampin Susceptible     Tetracycline Susceptible     Trimethoprim + Sulfamethoxazole Susceptible     Vancomycin Susceptible                Susceptibility Comments     Staphylococcus aureus, MRSA    This isolate does not demonstrate inducible clindamycin resistance in vitro.                     Xr Knee 1 Or 2 View Right    Result Date: 5/3/2020  Impression: Diffuse soft tissue swelling involves the right lower extremity. It is nonspecific. Cellulitis would be in the differential diagnosis.  No subcutaneous emphysema is seen.  No retained radiopaque foreign body is identified.  No acute fracture or acute  malalignment.  No definite radiographic evidence for osteomyelitis.  A small right knee joint effusion is suspected.  Electronically Signed By-Dr. Pascual Day MD On:5/3/2020 4:01 AM This report was finalized on [id number] by Dr. Pascual Day MD.    Mri Knee Right Without Contrast    Result Date: 5/5/2020  Impression: 1.Evidence for a peripheral tear of the posterior horn of lateral meniscus. There is evidence for associated para meniscal cyst measuring 8 mm. 2.Evidence for large horizontal type meniscal tear involving the posterior horn and body segment of the medial meniscus. There is a displaced meniscal fragment which extends into the inferior margin of the medial gutter. 3.Extensive abnormal edema throughout the soft tissues overlying the knee most pronounced at the anterior lateral aspect. A complex fluid collection is noted. Given the clinical presentation, the findings suggest changes of soft tissue cellulitis with abscess formation. 4.There is also evidence for potential spread of infection across fascial planes with involvement of the underlying muscular soft tissues of the superior right calf or underlying pyomyositis. 5.No evidence for osteomyelitis. Electronically Signed: Izaiah Foster MD 5/5/2020 15:54 EDT                             Test Results Pending at Discharge   Order Current Status    Anaerobic Culture - Swab, Knee, Right In process    Anaerobic Culture - Swab, Knee, Right In process            Procedures Performed  Procedure(s):  PATELLA BURSA EXCISION         Consults:   Consults     Date and Time Order Name Status Description    5/5/2020 1232 Inpatient Orthopedic Surgery Consult Completed             Discharge Details        Discharge Medications      New Medications      Instructions Start Date   doxycycline 100 MG tablet  Commonly known as:  ADOXA   100 mg, Oral, Every 12 Hours Scheduled      HYDROcodone-acetaminophen 5-325 MG per tablet  Commonly known as:  Norco   1 tablet,  Oral, Every 6 Hours PRN         Continue These Medications      Instructions Start Date   multivitamin with minerals tablet tablet   1 tablet, Oral, Daily         Stop These Medications    sulfamethoxazole-trimethoprim 800-160 MG per tablet  Commonly known as:  BACTRIM DS,SEPTRA DS            No Known Allergies      Discharge Disposition:  Home or Self Care    Diet:  Hospital:  Diet Order   Procedures   • Diet Regular         Discharge Activity:         CODE STATUS:    Code Status and Medical Interventions:   Ordered at: 05/06/20 0916     Level Of Support Discussed With:    Patient     Code Status:    CPR     Medical Interventions (Level of Support Prior to Arrest):    Full         Follow-up Appointments  No future appointments.    Additional Instructions for the Follow-ups that You Need to Schedule     Discharge Follow-up with PCP   As directed       Currently Documented PCP:    Provider, No Known    PCP Phone Number:    None     Follow Up Details:  one week time.         Discharge Follow-up with Specified Provider: follow up with Orthopaedic service in two week time.; 2 Weeks   As directed      To:  follow up with Orthopaedic service in two week time.    Follow Up:  2 Weeks                 Condition on Discharge:      Stable        Electronically signed by Liat Staples MD, 05/08/20, 11:50 AM.      Time: I spent 33  minutes on this discharge activity which included face-to-face encounter with the patient/reviewing the data in the system/coordination of the care with the nursing staff as well as consultants/documentation/entering orders.

## 2020-05-08 NOTE — PROGRESS NOTES
Continued Stay Note   Jan     Patient Name: Acosta Howard  MRN: [id number]  Today's Date: 5/8/2020    Admit Date: 5/2/2020    Discharge Plan     Row Name 05/08/20 1105       Plan    Plan  Routine d/c home. Pt to d/c home on PO Abx and stated he can afford them. PCP follow up appointment arranged with [facility] on 5/18 at 11:00 AM.     Plan Comments  SW spoke w/ pt this AM to confirm that he can afford Rx for PO doxycycline at d/c. Pt stated that he is aware of [employer] and can access via his phone. Estimated cost of doxycycline Rx is $14 at [facility] with [facility] coupon. SW reminded pt of new PCP follow up appointment with [facility] on 5/18 at 11:00 AM. Pt acknowledged the appointment and denied any barriers to attending that appointment.         Dalila Joya, ANAW, LSW  PRN   Phone:  [phone]

## 2020-05-08 NOTE — PROGRESS NOTES
LOS: 4 days   Patient Care Team:  Provider, No Known as PCP - General        Subjective     Postop day 2 from incision and drainage right prepatellar bursa, pain okay except with dressing change      Objective     Vital Signs  Vitals:    05/07/20 0337 05/07/20 1248 05/07/20 1920 05/08/20 0317   BP: 150/87 153/96 146/85 119/74   BP Location:  Left arm     Patient Position:  Lying     Pulse: 76 80 71 73   Resp: 20 16 19 20   Temp: 97.6 °F (36.4 °C) 97.4 °F (36.3 °C) 97.4 °F (36.3 °C) 97.8 °F (36.6 °C)   TempSrc: Oral Oral Oral Oral   SpO2: 99% 96% 99% 96%   Weight:       Height:           Physical Exam:   Pleasant no apparent distress alert and orient x3  Dressing clean dry and intact     Results Review:     I reviewed the patient's new clinical results.  I reviewed the patient's new imaging results    Lab Results (last 24 hours)     Procedure Component Value Units Date/Time    Wound Culture - Wound, Knee, Right [[id number]]  (Abnormal) Collected:  05/06/20 0950    Specimen:  Wound from Knee, Right Updated:  05/08/20 0702     Wound Culture Scant growth (1+) Staphylococcus aureus, MRSA     Comment:   Methicillin resistant Staphylococcus aureus, Patient may be an isolation risk.        Gram Stain Moderate (3+) WBCs per low power field      Occasional Gram positive cocci, intracellular    Narrative:       Refer to previous wound culture collected on 5/6/2020 0948 for MICs.    Wound Culture - Wound, Knee, Right [[id number]]  (Abnormal)  (Susceptibility) Collected:  05/06/20 0948    Specimen:  Wound from Knee, Right Updated:  05/08/20 0702     Wound Culture Scant growth (1+) Staphylococcus aureus, MRSA     Comment:   Methicillin resistant Staphylococcus aureus, Patient may be an isolation risk.        Gram Stain Few (2+) WBCs per low power field      No organisms seen    Susceptibility      Staphylococcus aureus, MRSA     ABEL     Clindamycin Susceptible     Erythromycin Resistant     Inducible Clindamycin Resistance  Negative     Oxacillin Resistant     Penicillin G Resistant     Rifampin Susceptible     Tetracycline Susceptible     Trimethoprim + Sulfamethoxazole Susceptible     Vancomycin Susceptible                Susceptibility Comments     Staphylococcus aureus, MRSA    This isolate does not demonstrate inducible clindamycin resistance in vitro.               Blood Culture - Blood, Arm, Right [[id number]] Collected:  05/02/20 1724    Specimen:  Blood from Arm, Right Updated:  05/07/20 1730     Blood Culture No growth at 5 days    Blood Culture - Blood, Arm, Left [[id number]] Collected:  05/02/20 1719    Specimen:  Blood from Arm, Left Updated:  05/07/20 1730     Blood Culture No growth at 5 days        Imaging Results (Last 24 Hours)     ** No results found for the last 24 hours. **            Medication Review:   Scheduled Meds:  !Vancomycin Level Draw Needed  Does not apply Once   aspirin 325 mg Oral Daily   nicotine 1 patch Transdermal Nightly   polyethylene glycol 17 g Oral Daily   vancomycin 1,250 mg Intravenous Q8H     Continuous Infusions:  Pharmacy to dose vancomycin      PRN Meds:.acetaminophen  •  HYDROcodone-acetaminophen  •  melatonin  •  Morphine **AND** naloxone  •  ondansetron **OR** ondansetron  •  oxyCODONE  •  Pharmacy to dose vancomycin  •  sodium chloride      Assessment/Plan     Stable with MRSA right knee bursa    Plan for disposition: Okay to discharge home  Remain off work for 10 days  Change dressing daily with packing with half-inch plain Nu Gauze  Doxycycline 100 mg every 12 hours for 14 days  Return to see me in 10 days    Negro Longoria MD  05/08/20  07:21

## 2020-05-08 NOTE — PLAN OF CARE
Problem: Patient Care Overview  Goal: Plan of Care Review  Outcome: Ongoing (interventions implemented as appropriate)  Note:   Pt is resting well. Pt is ambulating to the restroom well. Will continue to monitor.

## 2020-05-08 NOTE — PROGRESS NOTES
Discharge Planning Assessment   Jan     Patient Name: Acosta Howard  MRN: [id number]  Today's Date: 5/8/2020    Admit Date: 5/2/2020          Plan    N/A Provider List Comment  -- patient to return home with oral antibiotics; he has been taught dressing changes to do at home;       Carol naegele rn  Case management  Office number [phone]  Cell phone [phone]

## 2020-05-09 ENCOUNTER — READMISSION MANAGEMENT (OUTPATIENT)
Dept: CALL CENTER | Facility: HOSPITAL | Age: 45
End: 2020-05-09

## 2020-05-09 NOTE — OUTREACH NOTE
Prep Survey      Responses   [facility] facility patient discharged from?  [facility]   Is LACE score < 7 ?  No   Eligibility  Readm Mgmt   Discharge diagnosis  Cellulitis of right lower extremity . I and D    COVID-19 Test Status  Not tested   Does the patient have one of the following disease processes/diagnoses(primary or secondary)?  General Surgery   Does the patient have Home health ordered?  No   Is there a DME ordered?  No   Prep survey completed?  Yes          Andreina Yoo RN

## 2020-05-11 LAB
BACTERIA SPEC ANAEROBE CULT: NORMAL
BACTERIA SPEC ANAEROBE CULT: NORMAL

## 2020-05-11 NOTE — PROGRESS NOTES
Discharge Planning Assessment  [facility]     Patient Name: Acosta Howard  MRN: [id number]  Today's Date: 5/11/2020    Admit Date: 5/2/2020          Plan    Final Discharge Disposition Code  01 - home or self-care    Final Note  return home       Carol naegele rn  Case management  Office number [phone]  Cell phone [phone]

## 2020-05-12 ENCOUNTER — READMISSION MANAGEMENT (OUTPATIENT)
Dept: CALL CENTER | Facility: HOSPITAL | Age: 45
End: 2020-05-12

## 2020-05-12 NOTE — OUTREACH NOTE
General Surgery Week 1 Survey      Responses   [facility] patient discharged from?  [facility]   Does the patient have one of the following disease processes/diagnoses(primary or secondary)?  General Surgery   Is there a successful TCM telephone encounter documented?  No   Week 1 attempt successful?  No   Unsuccessful attempts  Attempt 1          Gerri Walden RN

## 2020-05-15 ENCOUNTER — READMISSION MANAGEMENT (OUTPATIENT)
Dept: CALL CENTER | Facility: HOSPITAL | Age: 45
End: 2020-05-15

## 2020-05-19 ENCOUNTER — READMISSION MANAGEMENT (OUTPATIENT)
Dept: CALL CENTER | Facility: HOSPITAL | Age: 45
End: 2020-05-19

## 2020-05-26 ENCOUNTER — READMISSION MANAGEMENT (OUTPATIENT)
Dept: CALL CENTER | Facility: HOSPITAL | Age: 45
End: 2020-05-26

## 2020-05-26 NOTE — OUTREACH NOTE
General Surgery Week 2 Survey      Responses   [facility] [place] patient discharged from?  [facility]   Does the patient have one of the following disease processes/diagnoses(primary or secondary)?  General Surgery   Week 2 attempt successful?  No   Unsuccessful attempts  Attempt 1          Gerri Walden RN

## 2020-05-29 ENCOUNTER — READMISSION MANAGEMENT (OUTPATIENT)
Dept: CALL CENTER | Facility: HOSPITAL | Age: 45
End: 2020-05-29

## 2020-05-29 NOTE — OUTREACH NOTE
General Surgery Week 2 Survey      Responses   [facility] patient discharged from?  [facility]   Does the patient have one of the following disease processes/diagnoses(primary or secondary)?  General Surgery   Week 2 attempt successful?  No   Unsuccessful attempts  Attempt 2          Lucia Alcantara RN

## 2020-06-04 ENCOUNTER — READMISSION MANAGEMENT (OUTPATIENT)
Dept: CALL CENTER | Facility: HOSPITAL | Age: 45
End: 2020-06-04

## 2020-06-04 NOTE — OUTREACH NOTE
General Surgery Week 3 Survey      Responses   [facility] patient discharged from?  [facility]   Does the patient have one of the following disease processes/diagnoses(primary or secondary)?  General Surgery   Week 3 attempt successful?  No   Unsuccessful attempts  Attempt 1          Gerri Walden RN

## 2020-06-05 ENCOUNTER — READMISSION MANAGEMENT (OUTPATIENT)
Dept: CALL CENTER | Facility: HOSPITAL | Age: 45
End: 2020-06-05

## 2020-06-05 NOTE — OUTREACH NOTE
General Surgery Week 3 Survey      Responses   [facility] patient discharged from?  [facility]   Does the patient have one of the following disease processes/diagnoses(primary or secondary)?  General Surgery   Week 3 attempt successful?  No   Unsuccessful attempts  Attempt 2          Roxana De La Cruz RN

## (undated) DEVICE — PK EXTREM 50

## (undated) DEVICE — PK PROC TURNOVER

## (undated) DEVICE — 3M™ IOBAN™ 2 ANTIMICROBIAL INCISE DRAPE 6650EZ: Brand: IOBAN™ 2

## (undated) DEVICE — GLV SURG SENSICARE PI ORTHO SZ8 LF STRL

## (undated) DEVICE — INTENDED FOR TISSUE SEPARATION, AND OTHER PROCEDURES THAT REQUIRE A SHARP SURGICAL BLADE TO PUNCTURE OR CUT.: Brand: BARD-PARKER ® CARBON RIB-BACK BLADES

## (undated) DEVICE — SPNG GZ AVANT 6PLY 4X4IN STRL PK/2

## (undated) DEVICE — TOWEL,OR,DSP,ST,WHITE,DLX,4/PK,20PK/CS: Brand: MEDLINE

## (undated) DEVICE — CUFF TOURNI 1BLADDER 1PRT 18IN STRL

## (undated) DEVICE — GAUZE,PACKING STRIP,PLAIN,1/2"X5YD,STRL: Brand: CURAD

## (undated) DEVICE — SOL IRRIG H2O 1000ML STRL

## (undated) DEVICE — CUFF TOURNI 1BLADDER 1PRT 30IN STRL

## (undated) DEVICE — TBG IRRI TUR Y/TYP NONVENT 98IN LF

## (undated) DEVICE — GLV SURG SENSICARE PI LF PF 8 GRN STRL

## (undated) DEVICE — GOWN,REINFORCE,POLY,SIRUS,BREATH SLV,XLG: Brand: MEDLINE

## (undated) DEVICE — CUFF SCD HEMOFORCE SEQ CALF STD MD

## (undated) DEVICE — ESWAB LQ AMIES  REG. NYLON FLOCKED  APPLICATOR: Brand: ESWAB™

## (undated) DEVICE — PAD,ABDOMINAL,5"X9",STERILE,LF,1/PK: Brand: MEDLINE INDUSTRIES, INC.

## (undated) DEVICE — GLV SURG SIGNATURE ESSENTIAL PF LTX SZ8.5

## (undated) DEVICE — NDL HYPO PRECISIONGLIDE/REG 18G 1IN PNK

## (undated) DEVICE — GLV SURG DERMASSURE GRN LF PF 8.5